# Patient Record
Sex: FEMALE | Race: WHITE | Employment: FULL TIME | URBAN - METROPOLITAN AREA
[De-identification: names, ages, dates, MRNs, and addresses within clinical notes are randomized per-mention and may not be internally consistent; named-entity substitution may affect disease eponyms.]

---

## 2018-12-26 ENCOUNTER — OFFICE VISIT (OUTPATIENT)
Dept: SURGERY | Age: 28
End: 2018-12-26

## 2018-12-26 VITALS
DIASTOLIC BLOOD PRESSURE: 76 MMHG | BODY MASS INDEX: 20.49 KG/M2 | SYSTOLIC BLOOD PRESSURE: 112 MMHG | HEART RATE: 68 BPM | WEIGHT: 120 LBS | HEIGHT: 64 IN

## 2018-12-26 DIAGNOSIS — R23.8 FACIAL AGING: Primary | ICD-10-CM

## 2018-12-26 PROCEDURE — DM00130 PR DERM ONLY BOTOX INJ LEVEL 4: Performed by: PLASTIC SURGERY

## 2018-12-27 NOTE — PROGRESS NOTES
Botulinum Toxin Injection Procedure Note:      The risks, benefits and options were discussed with the pt. The risks included but not limited to pain, bleeding, infection, asymmetry,  and need for further procedures. The patient understands that there is a risk for upper eyelid ptosis. There may be a need for touch up injections and follow up at 2 weeks is encouraged. All of Her questions were answered to Her satisfaction and She agrees to proceed with the operation. The glabella and forehead was cleansed with alcohol. Ice was used to numb the area. The different FDA approved brands of botulinum toxin A were discussed with the patient, Botox was agreed upon and reconstituted in a concentration of 1 unit/ 0.01cc with sterile injectable saline. 25 units were injected into the areas. There was pinpoint bleeding and local redness. The patient tolerated the procedure well. The patient was counseled to use ice for the next hour and limit strenuous activity for 24 hours. Follow up in 2 weeks for check or 3 months for re-injection. I attest that the patient was seen and examined by me, and concur with the documentation above. I agree with the assessment and the plan outlined. This document is generated, in part, by voice recognition software and thus  syntax and grammatical errors are possible.     Aaron Carney  9:44 AM  12/27/2018

## 2019-05-01 ENCOUNTER — OFFICE VISIT (OUTPATIENT)
Dept: SURGERY | Age: 29
End: 2019-05-01
Payer: COMMERCIAL

## 2019-05-01 ENCOUNTER — HOSPITAL ENCOUNTER (OUTPATIENT)
Age: 29
Discharge: HOME | End: 2019-05-03
Payer: COMMERCIAL

## 2019-05-01 VITALS — HEIGHT: 64 IN | BODY MASS INDEX: 19.97 KG/M2 | WEIGHT: 117 LBS

## 2019-05-01 DIAGNOSIS — L98.9 SKIN LESION: Primary | ICD-10-CM

## 2019-05-01 PROCEDURE — 99204 OFFICE O/P NEW MOD 45 MIN: CPT | Performed by: PLASTIC SURGERY

## 2019-05-01 PROCEDURE — 88305 TISSUE EXAM BY PATHOLOGIST: CPT

## 2019-05-01 RX ORDER — LIDOCAINE HYDROCHLORIDE AND EPINEPHRINE 10; 10 MG/ML; UG/ML
20 INJECTION, SOLUTION INFILTRATION; PERINEURAL ONCE
Status: COMPLETED | OUTPATIENT
Start: 2019-05-01 | End: 2019-05-01

## 2019-05-01 RX ADMIN — LIDOCAINE HYDROCHLORIDE AND EPINEPHRINE 20 ML: 10; 10 INJECTION, SOLUTION INFILTRATION; PERINEURAL at 15:20

## 2019-05-01 NOTE — PROGRESS NOTES
Department of Plastic Surgery - Adult  Attending Consult Note      CHIEF COMPLAINT:  Lesion of stomach     History Obtained From:  patient    HISTORY OF PRESENT ILLNESS:                The patient is a 26 y.o. female who presents with lesion to the abdomen. The patient states that they first noticed the lesion a few years ago. It has grown in size since they first noticed the lesion. The lesion has changed in color and has had discharge or bleeding. The pt has not the lesion biopsied previously. The patient has not had the lesion removed previously. The patient states the lesion is not painful. The pt denies any associated symptoms. Past Medical History:    No past medical history on file. Past Surgical History:    Past Surgical History:   Procedure Laterality Date    BREAST SURGERY Bilateral 12/22/2016    exchange bilateral breast silicone implant    FOOT SURGERY      planters warts    OTHER SURGICAL HISTORY Bilateral 12/14/15    breast augmentationwith placement silicone implants    TONSILLECTOMY       Current Medications:   No current facility-administered medications for this visit. Allergies:  Patient has no known allergies. Social History:   Social History     Socioeconomic History    Marital status: Single     Spouse name: Not on file    Number of children: Not on file    Years of education: Not on file    Highest education level: Not on file   Occupational History    Not on file   Social Needs    Financial resource strain: Not on file    Food insecurity:     Worry: Not on file     Inability: Not on file    Transportation needs:     Medical: Not on file     Non-medical: Not on file   Tobacco Use    Smoking status: Never Smoker    Smokeless tobacco: Never Used   Substance and Sexual Activity    Alcohol use:  Yes     Alcohol/week: 0.0 oz     Comment: social    Drug use: No    Sexual activity: Not on file   Lifestyle    Physical activity:     Days per week: Not on file Minutes per session: Not on file    Stress: Not on file   Relationships    Social connections:     Talks on phone: Not on file     Gets together: Not on file     Attends Mandaen service: Not on file     Active member of club or organization: Not on file     Attends meetings of clubs or organizations: Not on file     Relationship status: Not on file    Intimate partner violence:     Fear of current or ex partner: Not on file     Emotionally abused: Not on file     Physically abused: Not on file     Forced sexual activity: Not on file   Other Topics Concern    Not on file   Social History Narrative    Not on file     Family History:   Family History   Problem Relation Age of Onset    Cancer Maternal Grandmother         breast       REVIEW OF SYSTEMS:    CONSTITUTIONAL:  negative for  fevers, chills, sweats and fatigue  EYES: negative for dipolpia or acute vision loss. RESPIRATORY:  negative for  dry cough, cough with sputum, dyspnea, wheezing and chest pain  HENT:negative for pain, headache, difficulty swallowing or nose bleeds. CARDIOVASCULAR:  negative for  chest pain, dyspnea, palpitations, syncope  GASTROINTESTINAL:  negative for nausea, vomiting, change in bowel habits, diarrhea, constipation and abdominal pain  EXTREMITIES: negative for edema  MUSCULOSKELETAL: negative for muscle weakness  SKIN: positive for lesion, negative for itching or rashes. HEME: negative for easy brusing or bleeding  BEHAVIOR/PSYCH:  negative for poor appetite, increased appetite, decreased sleep and poor concentration      PHYSICAL EXAM:    VITALS:  Ht 5' 4\" (1.626 m)   Wt 117 lb (53.1 kg)   LMP 04/26/2019   Breastfeeding?  No   BMI 20.08 kg/m²   CONSTITUTIONAL:  awake, alert, cooperative, no apparent distress, and appears stated age  EYES: PERRLA, EOMI, no signs of occular infection  LUNGS:  No increased work of breathing, good air exchange  CARDIOVASCULAR:  Normal rate  ABDOMEN:  Soft, non-distended  EXTREMITIES: no signs of clubbing or cyanosis. MUSCULOSKELETAL: negative for flaccid muscle tone or spastic movements. NEURO: Cranial nerves II-XII grossly intact. No signs of agitated mood. SKIN: abdomen lesion-  3mm x 3mm,  brown in color, irregular border, raised, no signs of bleeding,drainage or infection. Non tender to palpation. DATA:    Labs: CBC: No results found for: WBC, RBC, HGB, HCT, MCV, MCH, MCHC, RDW, PLT, MPV  BMP:  No results found for: NA, K, CL, CO2, BUN, LABALBU, CREATININE, CALCIUM, GFRAA, LABGLOM, GLUCOSE    Radiology Review:  No radiology needed at this time  Pathology Review:  No Pathology reviewed       IMPRESSION/RECOMMENDATIONS:        Diagnosis  -) Abdomen skin lesion    -We will plan to proceed and have the lesion biopsied.  -The risks, benefits and options were discussed with the pt. The risks included but not limited to pain, bleeding, infection, heavy scarring, damage to surrounding structures, fluid collections, asymmetry, and need for further procedures. All of Her questions were answered to their satisfaction and She agrees to proceed with the procedure.  -After consent was obtained, the area was cleansed with an alcohol swab. Local anesthesia consisting of 1% lidocaine with 1:100,000 epinepherine was injected  surrounding the area. The local was allowed to work. Using a 10-blade the lesion was shaved, hemostasis was obtained and a bandage was placed. The procedure was performed by Dr. Good Mirza    The patient was educated to keep the bandage in place and apply bacitracin to the wound site BID. OK to shower at this time. Advised the patient that they can allow soap and water to rinse of the wound site while showering. Once they are done in the shower they are to pat dry the incision site with a clean paper towel. No baths, hot tubs or soaking of the wound site at this time. Pt voices understanding.      Photos obtained    We will call her with results       I attest that the patient was seen and examined by me, and concur with the documentation above. I agree with the assessment and the plan outlined. This document is generated, in part, by voice recognition software and thus  syntax and grammatical errors are possible.     Yelena Tavarez  2:33 PM  5/8/2019

## 2019-05-09 ENCOUNTER — TELEPHONE (OUTPATIENT)
Dept: SURGERY | Age: 29
End: 2019-05-09

## 2020-11-18 ENCOUNTER — HOSPITAL ENCOUNTER (OUTPATIENT)
Age: 30
Discharge: HOME | End: 2020-11-18
Payer: COMMERCIAL

## 2020-11-18 LAB
GLUCOSE FASTING: 83 MG/DL
GLUCOSE TOLERANCE TEST 2 HOUR: 109 MG/DL

## 2020-11-18 PROCEDURE — 82950 GLUCOSE TEST: CPT

## 2020-11-18 PROCEDURE — 82947 ASSAY GLUCOSE BLOOD QUANT: CPT

## 2020-11-18 PROCEDURE — 83525 ASSAY OF INSULIN: CPT

## 2020-11-18 PROCEDURE — 36415 COLL VENOUS BLD VENIPUNCTURE: CPT

## 2020-11-21 LAB — INSULIN: 3 UIU/ML (ref 3–19)

## 2021-03-18 ENCOUNTER — IMMUNIZATION (OUTPATIENT)
Dept: PRIMARY CARE CLINIC | Age: 31
End: 2021-03-18
Payer: COMMERCIAL

## 2021-03-18 PROCEDURE — 91300 COVID-19, PFIZER VACCINE 30MCG/0.3ML DOSE: CPT | Performed by: NURSE PRACTITIONER

## 2021-03-18 PROCEDURE — 0001A COVID-19, PFIZER VACCINE 30MCG/0.3ML DOSE: CPT | Performed by: NURSE PRACTITIONER

## 2021-04-08 ENCOUNTER — IMMUNIZATION (OUTPATIENT)
Dept: PRIMARY CARE CLINIC | Age: 31
End: 2021-04-08
Payer: COMMERCIAL

## 2021-04-08 PROCEDURE — 91300 COVID-19, PFIZER VACCINE 30MCG/0.3ML DOSE: CPT | Performed by: NURSE PRACTITIONER

## 2021-04-08 PROCEDURE — 0002A COVID-19, PFIZER VACCINE 30MCG/0.3ML DOSE: CPT | Performed by: NURSE PRACTITIONER

## 2021-06-28 DIAGNOSIS — Z34.92 PRENATAL CARE IN SECOND TRIMESTER: Primary | ICD-10-CM

## 2021-07-14 ENCOUNTER — ANCILLARY PROCEDURE (OUTPATIENT)
Dept: OBGYN CLINIC | Age: 31
End: 2021-07-14
Payer: COMMERCIAL

## 2021-07-14 ENCOUNTER — ROUTINE PRENATAL (OUTPATIENT)
Dept: OBGYN CLINIC | Age: 31
End: 2021-07-14
Payer: COMMERCIAL

## 2021-07-14 VITALS
DIASTOLIC BLOOD PRESSURE: 82 MMHG | HEIGHT: 64 IN | WEIGHT: 141.8 LBS | BODY MASS INDEX: 24.21 KG/M2 | SYSTOLIC BLOOD PRESSURE: 121 MMHG | HEART RATE: 102 BPM

## 2021-07-14 DIAGNOSIS — Z3A.16 16 WEEKS GESTATION OF PREGNANCY: ICD-10-CM

## 2021-07-14 DIAGNOSIS — Z36.89 ENCOUNTER FOR FETAL ANATOMIC SURVEY: ICD-10-CM

## 2021-07-14 DIAGNOSIS — O09.812 ENCOUNTER FOR SUPERVISION OF PREGNANCY RESULTING FROM ASSISTED REPRODUCTIVE TECHNOLOGY IN SECOND TRIMESTER, ANTEPARTUM: Primary | ICD-10-CM

## 2021-07-14 DIAGNOSIS — O44.02 PLACENTA PREVIA, SECOND TRIMESTER: ICD-10-CM

## 2021-07-14 LAB
GLUCOSE URINE, POC: NORMAL
PROTEIN UA: NEGATIVE

## 2021-07-14 PROCEDURE — 99203 OFFICE O/P NEW LOW 30 MIN: CPT | Performed by: OBSTETRICS & GYNECOLOGY

## 2021-07-14 PROCEDURE — 99999 PR OFFICE/OUTPT VISIT,PROCEDURE ONLY: CPT | Performed by: OBSTETRICS & GYNECOLOGY

## 2021-07-14 PROCEDURE — 81002 URINALYSIS NONAUTO W/O SCOPE: CPT | Performed by: OBSTETRICS & GYNECOLOGY

## 2021-07-14 PROCEDURE — 76805 OB US >/= 14 WKS SNGL FETUS: CPT | Performed by: OBSTETRICS & GYNECOLOGY

## 2021-07-14 NOTE — PROGRESS NOTES
21    RE:  Nella Mcmahon   : 1990   AGE: 27 y.o. REFERRING PHYSICIAN:           Edger Paget, M.D. Dear Dr. Naranjo Jump you for referring Nella aponte 27 y.o.   to our office. REASON FOR REFERRAL, PER YOUR REQUEST:  Fetal ultrasound assessment. A detailed report is enclosed for your review. Of note is that the estimated gestational age is 16w0d based on the composite gestational age established by fetal biometric assessment today. These dates are consistent with the patient's established dating parameters. No apparent gross fetal anatomic abnormalities were noted in the areas visualized. The amniotic fluid volume is within normal limits. No further follow-up assessments have been scheduled in our office, however; we would be happy to see your patient at any time if you request.    Thank you again, doctor, for allowing us to be of service to your patient. If I can be of further assistance, please do not hesitate to call.       Sincerely,        Ollie Cockayne, M.D., [address] [facility]      Current encounter billing:  [place] OFFICE/OUTPT VISIT,PROCEDURE ONLY [[id number]  US OB 14 Plus Weeks Single or First Gestation [[id number] Custom]

## 2021-07-14 NOTE — PATIENT INSTRUCTIONS
Please arrive for your scheduled appointment at least 15 minutes early with your actual insurance card+ a photo ID. Also if you need any refills ordered or have questions, it may take up 48 hours to reply. Please allow ample time for your refills. Call me when you use last refill. Thank you for your cooperation. Any questions contact [facility] at [phone]. If you are experiencing an emergency and need immediate help, call 911 or go to go emergency room or labor and delivery. if you are sick, not feeling well or have an infectious process going on please reschedule your appointment by calling [phone]. Also if any family members are not feeling well, please do not bring them to your appointment. We appreciate your cooperation. We are doing this in order to protect our pregnant mothers+ their babies. Call your primary obstetrician with bleeding, leaking of fluid, abdominal tenderness, headache, blurry vision, epigastric pain and increased urinary frequency. Patient Education        Weeks 14 to 25 of Your Pregnancy: Care Instructions  Your Care Instructions     During this time, you may start to \"show,\" so that you look pregnant to people around you. You may also notice some changes in your skin, such as itchy spots on your palms or acne on your face. Your baby is now able to pass urine, and your baby's first stool (meconium) is starting to collect in his or her intestines. Hair is also beginning to grow on your baby's head. At your next visit, between weeks 18 and 20, your doctor may do an ultrasound test. The test allows your doctor to check for certain problems. Your doctor can also tell the sex of your baby. This is a good time to think about whether you want to know whether your baby is a boy or a girl. Talk to your doctor about getting a flu shot to help keep you healthy during your pregnancy. As your pregnancy moves along, it is common to worry or feel anxious. Your body is changing a lot.  And you are thinking about giving birth, the health of your baby, and becoming a parent. You can learn to cope with any anxiety and stress you feel. Follow-up care is a key part of your treatment and safety. Be sure to make and go to all appointments, and call your doctor if you are having problems. It's also a good idea to know your test results and keep a list of the medicines you take. How can you care for yourself at home? Reduce stress    · Ask for help with cooking and housekeeping.     · Figure out who or what causes your stress. Avoid these people or situations as much as possible.     · Relax every day. Taking 10- to 15-minute breaks can make a big difference. Take a walk, listen to music, or take a warm bath.     · Learn relaxation techniques at prenatal or yoga class. Or buy a relaxation tape.     · List your fears about having a baby and becoming a parent. Share the list with someone you trust. Decide which worries are really small, and try to let them go. Exercise    · If you did not exercise much before pregnancy, start slowly. Walking is best. [employer], and do a little more every day.     · Brisk walking, easy jogging, low-impact aerobics, water aerobics, and yoga are good choices. Some sports, such as scuba diving, horseback riding, downhill skiing, gymnastics, and water skiing, are not a good idea.     · Try to do at least 2½ hours a week of moderate exercise, such as a fast walk. One way to do this is to be active 30 minutes a day, at least 5 days a week.     · Wear loose clothing. And wear shoes and a bra that provide good support.     · Warm up and cool down to start and finish your exercise.     · If you want to use weights, be sure to use light weights. They reduce stress on your joints.    Stay at the best weight for you    · Experts recommend that you gain about 1 pound a month during the first 3 months of your pregnancy.     · Experts recommend that you gain about 1 pound a week during your last 6 months of pregnancy, for a total weight gain of 25 to 35 pounds.     · If you are underweight, you will need to gain more weight (about 28 to 40 pounds).     · If you are overweight, you may not need to gain as much weight (about 15 to 25 pounds).     · If you are gaining weight too fast, use common sense. Exercise every day, and limit sweets, fast foods, and fats. Choose lean meats, fruits, and vegetables.     · If you are having twins or more, your doctor may refer you to a dietitian. Where can you learn more? Go to https://chpecatalinaeb.Worldrat. org and sign in to your Partender account. Enter J096 in the KnewCoin box to learn more about \"Weeks 14 to 18 of Your Pregnancy: Care Instructions. \"     If you do not have an account, please click on the \"Sign Up Now\" link. Current as of: October 8, 2020               Content Version: 12.9  © 2006-2021 [employer]. Care instructions adapted under license by [facility] . If you have questions about a medical condition or this instruction, always ask your healthcare professional. [place] [address] any warranty or liability for your use of this information. Patient Education        Learning About When to Call Your Doctor During Pregnancy (Up to 20 Weeks)  Your Care Instructions     It's common to have concerns about what might be a problem during pregnancy. Although most pregnant women don't have any serious problems, it's important to know when to call your doctor if you have certain symptoms. These are general suggestions. Your doctor may give you some more information about when to call. When to call your doctor (up to 20 weeks)  Call 911 anytime you think you may need emergency care. For example, call if:  · You passed out (lost consciousness). Call your doctor now or seek immediate medical care if:  · You have a fever. · You have vaginal bleeding.   · You are dizzy or lightheaded, or you feel like you may faint. · You have symptoms of a urinary tract infection. These may include:  ? Pain or burning when you urinate. ? A frequent need to urinate without being able to pass much urine. ? Pain in the flank, which is just below the rib cage and above the waist on either side of the back. ? Blood in your urine. · You have belly pain. · You think you are having contractions. · You have a sudden release of fluid from your vagina. Watch closely for changes in your health, and be sure to contact your doctor if:  · You have vaginal discharge that smells bad. · You have other concerns about your pregnancy. Follow-up care is a key part of your treatment and safety. Be sure to make and go to all appointments, and call your doctor if you are having problems. It's also a good idea to know your test results and keep a list of the medicines you take. Where can you learn more? Go to https://LeadGeniuspeWistia.BlueRonin. org and sign in to your [employer] account. Enter W020 in the Thumbs Up box to learn more about \"Learning About When to Call Your Doctor During Pregnancy (Up to 20 Weeks). \"     If you do not have an account, please click on the \"Sign Up Now\" link. Current as of: October 8, 2020               Content Version: 12.9  © 2006-2021 [employer]wise[employer]. Care instructions adapted under license by [facility] If you have questions about a medical condition or this instruction, always ask your healthcare professional. [place] [address] any warranty or liability for your use of this information.

## 2021-07-14 NOTE — LETTER
21    RE:  Van Valdez   : 1990   AGE: 27 y.o. REFERRING PHYSICIAN:           Yazmin Logan M.D. Dear Dr. Netta Cohen you for referring Van aponte 27 y.o.   to our office. REASON FOR REFERRAL, PER YOUR REQUEST:  Fetal ultrasound assessment. A detailed report is enclosed for your review. Of note is that the estimated gestational age is 16w0d based on the composite gestational age established by fetal biometric assessment today. These dates are consistent with the patient's established dating parameters. No apparent gross fetal anatomic abnormalities were noted in the areas visualized. The amniotic fluid volume is within normal limits. No further follow-up assessments have been scheduled in our office, however; we would be happy to see your patient at any time if you request.    Thank you again, doctor, for allowing us to be of service to your patient. If I can be of further assistance, please do not hesitate to call.       Sincerely,        Khadar Yoo M.D., [address] [facility]      Current encounter billing:  [place] OFFICE/OUTPT VISIT,PROCEDURE ONLY [[id number]  US OB 14 Plus Weeks Single or First Gestation [[id number] Custom]

## 2021-10-13 ENCOUNTER — ANCILLARY PROCEDURE (OUTPATIENT)
Dept: OBGYN CLINIC | Age: 31
End: 2021-10-13
Payer: COMMERCIAL

## 2021-10-13 ENCOUNTER — ROUTINE PRENATAL (OUTPATIENT)
Dept: OBGYN CLINIC | Age: 31
End: 2021-10-13
Payer: COMMERCIAL

## 2021-10-13 VITALS
WEIGHT: 156.5 LBS | DIASTOLIC BLOOD PRESSURE: 79 MMHG | BODY MASS INDEX: 26.86 KG/M2 | HEART RATE: 86 BPM | SYSTOLIC BLOOD PRESSURE: 120 MMHG

## 2021-10-13 DIAGNOSIS — O09.812 ENCOUNTER FOR SUPERVISION OF PREGNANCY RESULTING FROM ASSISTED REPRODUCTIVE TECHNOLOGY IN SECOND TRIMESTER, ANTEPARTUM: ICD-10-CM

## 2021-10-13 DIAGNOSIS — O44.02 PLACENTA PREVIA, SECOND TRIMESTER: Primary | ICD-10-CM

## 2021-10-13 DIAGNOSIS — Z3A.29 29 WEEKS GESTATION OF PREGNANCY: ICD-10-CM

## 2021-10-13 PROCEDURE — 99212 OFFICE O/P EST SF 10 MIN: CPT | Performed by: OBSTETRICS & GYNECOLOGY

## 2021-10-13 PROCEDURE — 76817 TRANSVAGINAL US OBSTETRIC: CPT | Performed by: OBSTETRICS & GYNECOLOGY

## 2021-10-13 PROCEDURE — 99203 OFFICE O/P NEW LOW 30 MIN: CPT | Performed by: OBSTETRICS & GYNECOLOGY

## 2021-10-13 PROCEDURE — 81002 URINALYSIS NONAUTO W/O SCOPE: CPT | Performed by: OBSTETRICS & GYNECOLOGY

## 2021-10-13 PROCEDURE — 76816 OB US FOLLOW-UP PER FETUS: CPT | Performed by: OBSTETRICS & GYNECOLOGY

## 2021-10-13 PROCEDURE — 76819 FETAL BIOPHYS PROFIL W/O NST: CPT | Performed by: OBSTETRICS & GYNECOLOGY

## 2021-10-13 NOTE — PROGRESS NOTES
10/13/21    RE:  Roro Damian   : 1990   AGE: 27 y.o. REFERRING PHYSICIAN:                    Vallarie Babinski, MD    Dear   This is a follow-up on Mrs. Roro Damian a 27 y.o.   who is seen today in our office. REASON FOR CONSULTATION:  · Follow-up on a pregnant patient with history of vaginal bleeding from placenta previa, and recent transfer of prenatal care to our office from [place]. Mrs Roro Damian gave the following history when I saw her today:    OB History    Para Term  AB Living   1 0 0 0 0 0   SAB TAB Ectopic Molar Multiple Live Births   0 0 0 0 0 0      # Outcome Date GA Lbr Leon/2nd Weight Sex Delivery Anes PTL Lv   1 Current              PAST GYNECOLOGICAL  HISTORY:  Negative for abnormal pap smears requiring surgical treatment. Negative for sexually transmitted diseases. PAST MEDICAL HISTORY:   Negative for Hypertension, Diabetes, Thyroid disease , Asthma or Heart disease. PAST SURGICAL HISTORY:  Past Surgical History:   Procedure Laterality Date    BREAST SURGERY Bilateral 2016    exchange bilateral breast silicone implant    CERVICAL POLYP REMOVAL      FOOT SURGERY      planters warts    OTHER SURGICAL HISTORY Bilateral 12/14/15    breast augmentationwith placement silicone implants    TONSILLECTOMY     Negative for Appendectomy, Cholecystectomy or surgery on the cervix such as LEEP, [facility] or Cryotherapy. ALLERGIES:    No Known Allergies    MEDICATIONS:    Prenatal Vitamins    SOCIAL  HISTORY: Denies smoking, Alcohol and Drug abuse.     REVIEW OF SYSTEMS:    CONSTITUTIONAL : No fever, no chills   HEENT :  No headache, no visual changes, no rhinorrhea, no sore throat   CARDIOVASCULAR :  No pain, no palpitations, no edema   RESPIRATORY :  No pain, no shortness of breath   GASTROINTESTINAL : No N/V, no D/C, no abdominal pain   GENITOURINARY :  No dysuria, hematuria and no incontinence   MUSCULOSKELETAL:  No myalgia, No back pain  NEUROLOGICAL :  No numbness, no tingling, no tremors. No history of seizures    FAMILY MEDICAL HISTORY:   Negative for birth defects, chromosomal anomalies and Mental retardation. OB Genetic Screening    Patient's Age 35+ at Date of Delivery No     Thalassemia MCV<80 No     Neural Tube Defect No     Congenital Heart Defect No     Down Syndrome No     Barron-Sachs No     Sickle Cell Disease or Trait No     Hemophilia No     Muscular Dystrophy No     Cystic Fibrosis No     Holt Chorea No     Mental Retardation/Autism No     Was Person Treated for Fragilex? No     Other Inherited Genetic Chromosomal Disorder? No     Maternal Metabolic Disorder No     Patient or [de-identified] Father Had Other Defects? No     Recurrent Pregnancy Loss or Still Birth? No        OB Infection History    High Risk Hepatitis B/Immunized? Yes     Live with Someone with or Exposed to TB? No     Patient or Partner has Hx of Genital Herpes? No     Rash or Viral Illness Since LMP? No     History of STD/GC/Chlamydia/HPV/Syphilis? No        Since her last visit to our office,. Mrs Greg Vital:  · Had vaginal bleeding September 23, 2021 in [place]. She was admitted for 2 nights. Was told that the bleeding was from the placenta previa. · Was found to have an elevated 1 hour 50 g Glucola test of 150 mg/dL in [place]. · Transferred her prenatal care from [place] to your office recently. When seen today in our office she had no complaints. She said that she has no more episodes of vaginal bleeding. PHYSICAL EXAMINATION:    General Appearance:  Healthy looking, alert, no acute distress. Eyes:     No pallor, no icterus, no photophobia. Ears:     No ear drainage. Nose:     No nasal drainage, no paranasal sinus tenderness. Throat:   Mucosa moist, no oral thrush, no exudate. Neck:     No nuchal rigidity. Back:     No CVA tenderness. Abdomen:    Soft nontender.   Extremities:    No pretibial pitting edema, no calf muscle tenderness. Skin:     No rashes, no lesions. BP: 120/79 Weight: 156 lb 8 oz (71 kg)   Pulse: 86     Body mass index is 26.86 kg/m². Urine dipstick:  Glucose : Negative   Albumin:  Negative       An ultrasound evaluation was done in our office today. Please refer to the enclosed copy of the ultrasound report for further information. IMPRESSION:  1. A 29w0d intrauterine pregnancy. 2. Placenta previa resolved. 3. Elevated 1 hour 50 g Glucola test of 150 mg/dL (in [place]), scheduled to have GTT next week. RECOMMENDATIONS/PLAN:  I discussed with the patient the following points:    1. The benefits and limitations of ultrasound in prenatal diagnosis. Some defects might not always be seen by ultrasound. Estimated incidence of these defects in the general population is 2- 4%. 2. No structural  anomalies are noted. Only genetic amniocentesis can rule out fetal chromosome anomalies. Normal ultrasound does not. 3. The size of her baby is appropriate for gestational age. 4. Fetal well-being was confirmed today. The amount of fluid around baby is normal.  The Biophysical profile score of 8/8 is reassuring, and the umbilical artery Doppler studies are normal.  5. She should monitor fetal well-being at home by counting movements after dinner. Her baby should  move 10 times in 2 hours; otherwise, she should call your office immediately. She is also to call, if she develops any headaches, blurred vision, abdominal pain, bleeding, or spotting, which are signs of preeclampsia. 6. No evidence of placenta previa on transvaginal ultrasound evaluation. Cervical length is reassuring against risk of  delivery. 7. She is to continue to follow with you in your office for ongoing obstetric care. 8. I recommend follow-up ultrasound evaluation in our [facility] office in 4 weeks to check on fetal wellbeing anatomy and growth.     Thank you again, doctor, for allowing us to be of service to your patient. If I can be of further assistance, please do not hesitate to call. Sincerely,        Mir Shi M.D., [address] [facility]      The total time in minutes spent reviewing medical records, reviewing imaging studies, performing ultrasonic imaging, reviewing laboratory testing, and documenting information was over 20  minutes, of which, 50% of the time was spent in patient education, counseling, and coordinating care with the patient, her provider, and/or her family. I answered all of her questions to her satisfaction. Current encounter billing:  [place] OFFICE OUTPATIENT VISIT 10-19 MINUTES [[id number]]  US OB Follow Up Transabdominal Approach [[id number] Custom]  US Fetal Biophysical Profile WO Non Stress Testing [47268 Custom]  US OB Transvaginal [[id number] Custom]    **This report has been created using voice recognition software.  It may contain minor errors     which are inherent in voice recognition technology**

## 2021-10-13 NOTE — LETTER
10/13/21    RE:  Luis Isaacs   : 1990   AGE: 27 y.o. REFERRING PHYSICIAN:                    Darryle Goo, MD    Dear   This is a follow-up on Mrs. Luis Isaacs a 27 y.o.   who is seen today in our office. REASON FOR CONSULTATION:  · Follow-up on a pregnant patient with history of vaginal bleeding from placenta previa, and recent transfer of prenatal care to our office from [place]. Mrs Luis Isaacs gave the following history when I saw her today:    OB History    Para Term  AB Living   1 0 0 0 0 0   SAB TAB Ectopic Molar Multiple Live Births   0 0 0 0 0 0      # Outcome Date GA Lbr Leon/2nd Weight Sex Delivery Anes PTL Lv   1 Current              PAST GYNECOLOGICAL  HISTORY:  Negative for abnormal pap smears requiring surgical treatment. Negative for sexually transmitted diseases. PAST MEDICAL HISTORY:   Negative for Hypertension, Diabetes, Thyroid disease , Asthma or Heart disease. PAST SURGICAL HISTORY:  Past Surgical History:   Procedure Laterality Date    BREAST SURGERY Bilateral 2016    exchange bilateral breast silicone implant    CERVICAL POLYP REMOVAL      FOOT SURGERY      planters warts    OTHER SURGICAL HISTORY Bilateral 12/14/15    breast augmentationwith placement silicone implants    TONSILLECTOMY     Negative for Appendectomy, Cholecystectomy or surgery on the cervix such as LEEP, [facility] or Cryotherapy. ALLERGIES:    No Known Allergies    MEDICATIONS:    Prenatal Vitamins    SOCIAL  HISTORY: Denies smoking, Alcohol and Drug abuse.     REVIEW OF SYSTEMS:    CONSTITUTIONAL : No fever, no chills   HEENT :  No headache, no visual changes, no rhinorrhea, no sore throat   CARDIOVASCULAR :  No pain, no palpitations, no edema   RESPIRATORY :  No pain, no shortness of breath   GASTROINTESTINAL : No N/V, no D/C, no abdominal pain   GENITOURINARY :  No dysuria, hematuria and no incontinence   MUSCULOSKELETAL:  No myalgia, No back pain  NEUROLOGICAL :  No numbness, no tingling, no tremors. No history of seizures    FAMILY MEDICAL HISTORY:   Negative for birth defects, chromosomal anomalies and Mental retardation. OB Genetic Screening    Patient's Age 35+ at Date of Delivery No     Thalassemia MCV<80 No     Neural Tube Defect No     Congenital Heart Defect No     Down Syndrome No     Barron-Sachs No     Sickle Cell Disease or Trait No     Hemophilia No     Muscular Dystrophy No     Cystic Fibrosis No     [place] Chorea No     Mental Retardation/Autism No     Was Person Treated for Fragilex? No     Other Inherited Genetic Chromosomal Disorder? No     Maternal Metabolic Disorder No     Patient or [de-identified] Father Had Other Defects? No     Recurrent Pregnancy Loss or Still Birth? No        OB Infection History    High Risk Hepatitis B/Immunized? Yes     Live with Someone with or Exposed to TB? No     Patient or Partner has Hx of Genital Herpes? No     Rash or Viral Illness Since LMP? No     History of STD/GC/Chlamydia/HPV/Syphilis? No        Since her last visit to our office,. Mrs Ace De La Cruz:  · Had vaginal bleeding September 23, 2021 in [place]. She was admitted for 2 nights. Was told that the bleeding was from the placenta previa. · Was found to have an elevated 1 hour 50 g Glucola test of 150 mg/dL in [place]. · Transferred her prenatal care from [place] to your office recently. When seen today in our office she had no complaints. She said that she has no more episodes of vaginal bleeding. PHYSICAL EXAMINATION:    General Appearance:  Healthy looking, alert, no acute distress. Eyes:     No pallor, no icterus, no photophobia. Ears:     No ear drainage. Nose:     No nasal drainage, no paranasal sinus tenderness. Throat:   Mucosa moist, no oral thrush, no exudate. Neck:     No nuchal rigidity. Back:     No CVA tenderness. Abdomen:    Soft nontender.   Extremities:    No pretibial pitting edema, no calf muscle tenderness. Skin:     No rashes, no lesions. BP: 120/79 Weight: 156 lb 8 oz (71 kg)   Pulse: 86     Body mass index is 26.86 kg/m². Urine dipstick:  Glucose : Negative   Albumin:  Negative       An ultrasound evaluation was done in our office today. Please refer to the enclosed copy of the ultrasound report for further information. IMPRESSION:  1. A 29w0d intrauterine pregnancy. 2. Placenta previa resolved. 3. Elevated 1 hour 50 g Glucola test of 150 mg/dL (in [place]), scheduled to have GTT next week. RECOMMENDATIONS/PLAN:  I discussed with the patient the following points:    1. The benefits and limitations of ultrasound in prenatal diagnosis. Some defects might not always be seen by ultrasound. Estimated incidence of these defects in the general population is 2- 4%. 2. No structural  anomalies are noted. Only genetic amniocentesis can rule out fetal chromosome anomalies. Normal ultrasound does not. 3. The size of her baby is appropriate for gestational age. 4. Fetal well-being was confirmed today. The amount of fluid around baby is normal.  The Biophysical profile score of 8/8 is reassuring, and the umbilical artery Doppler studies are normal.  5. She should monitor fetal well-being at home by counting movements after dinner. Her baby should  move 10 times in 2 hours; otherwise, she should call your office immediately. She is also to call, if she develops any headaches, blurred vision, abdominal pain, bleeding, or spotting, which are signs of preeclampsia. 6. No evidence of placenta previa on transvaginal ultrasound evaluation. Cervical length is reassuring against risk of  delivery. 7. She is to continue to follow with you in your office for ongoing obstetric care. 8. I recommend follow-up ultrasound evaluation in our [facility] office in 4 weeks to check on fetal wellbeing anatomy and growth.     Thank you again, doctor, for allowing us to be of service to your patient. If I can be of further assistance, please do not hesitate to call. Sincerely,        Karin Valente M.D., [address] [facility]      The total time in minutes spent reviewing medical records, reviewing imaging studies, performing ultrasonic imaging, reviewing laboratory testing, and documenting information was over 20  minutes, of which, 50% of the time was spent in patient education, counseling, and coordinating care with the patient, her provider, and/or her family. I answered all of her questions to her satisfaction. Current encounter billing:  [facility] OFFICE OUTPATIENT VISIT 10-19 MINUTES [[id number]]  US OB Follow Up Transabdominal Approach [EXE189 Custom]  US Fetal Biophysical Profile WO Non Stress Testing [49627 Custom]  US OB Transvaginal [53153 Custom]    **This report has been created using voice recognition software.  It may contain minor errors     which are inherent in voice recognition technology**

## 2021-10-14 LAB
GLUCOSE URINE, POC: NORMAL
PROTEIN UA: NEGATIVE

## 2021-10-25 ENCOUNTER — HOSPITAL ENCOUNTER (OUTPATIENT)
Dept: DIABETES SERVICES | Age: 31
Setting detail: THERAPIES SERIES
Discharge: HOME | End: 2021-10-25
Payer: COMMERCIAL

## 2021-10-25 VITALS — HEIGHT: 64 IN | WEIGHT: 155 LBS | BODY MASS INDEX: 26.46 KG/M2

## 2021-10-25 PROCEDURE — G0108 DIAB MANAGE TRN  PER INDIV: HCPCS

## 2021-10-25 NOTE — PROGRESS NOTES
Diabetes Self-Management Education Record    Participant Name: Dima Mata  Referring Provider: Ranjana Rodriguez MD  Assessment/Evaluation Ratings:  1=Needs Instruction   4=Demonstrates Understanding/Competency  2=Needs Review   NC=Not Covered    3=Comprehends Key Points  N/A=Not Applicable  Topics/Learning Objectives Pre-session Assess Date:  Instructor initials/date  10/25/21 CS Instr. Date    Instructor initials/date  10/25/21 CS Follow-up Post- session Eval Comments   Diabetes disease process & Treatment process:   -Define type of diabetes in simple terms.  - Describe the ABCs of  diabetes management  -Identify own type of diabetes  -Identify lifestyle changes/treatment options  -other:  1 [x] All     []  []  []  []  []  4 10/25/21 CS  Newly dx GDM, first pregnancy, 30 weeks, due 12/29/21   Developing strategies for Healthy coping/psychosocial issues:    -Describe feelings about living with diabetes  -Identify coping strategies and sources of stress  -Identify support needed & support network available  -Complete PAID-5 Diabetes questionnaire 1 [x] All     []  []  []    []  4 10/25/21 CS  Pt states  and family as support       Prevention, detection & treatment of Chronic complications:    -Identify the prevention, detection and treatment for complications including immunizations, preventive eye, foot, dental and renal exams as indicated per the participant's duration of diabetes and health status.  -Define the natural course of diabetes and the relationship of blood glucose levels to long term complications of diabetes.   1 [x] All     []            []  4    Prevention, detection & treatment of acute complications:    -State the causes,signs & symptoms of hyper & hypoglycemia, and prevention & treatment strategies.   -Describe sick day guidelines  DKA /indications for ketone testing &  when to call physician  1 [x] All     []      []    4 10/25/21 CS  Pt denies           -Identify severe weather/situation crisis  & diabetes supplies management  []      Using medications safely:   -State effects of diabetes medicines on blood glucose levels;  -List diabetes medication taken, action & side effects  [] All     []  []      Insulin/Injectables/glucagon  -Name appropriate injection sites; proper storage; supplies needed;     []       Demonstrate proper technique  []      Monitoring blood glucose, interpreting and using results:   -Identify the purpose of testing   -Identify recommended & personal blood glucose targets & HgbA1C target levels  -State the Importance of logging blood glucose levels for pattern recognition;   -State benefits of reading/using pt generated health data  -Verbalize safe lancet disposal 1 [x] All     []  []    []  []  []  4  10/25/21 CS  Log sheet provided. Pt states fasting values 79-81 mg/dl, 2 hours after meals 100-113 mg/dl. -Demonstrate proper testing technique  []      Incorporating physical activity into lifestyle:   -State effect of exercise on blood glucose levels;   -State benefits of regular exercise;   -Define safety considerations/food choices if needed.  -Describe contraindications/maintenance of activity.  1 [x] All     []  []    []  []  4 10/25/21 CS  Pt states walking 3-4 times/week for 30 minutes   Incorporating nutritional management into lifestyle:   -Describe effect of type, amount & timing of food on blood glucose  -Describe methods for preparing and planning healthy meals  -Correctly read food labels  -Name 3 foods high in Carbohydrate 1 [x] All       []    []    []  []  4 10/25/21 CS  Pt able to name carbohydrate foods and read food labels.    -Plan a carbohydrate-controlled meal based on individualized meal plan  -Demonstrate CHO counting/portion control  1 [x]  [x]  4 10/25/21 CS  Instructed patient on 1900 calorie carbohydrate controlled meal plan:   6 servings fat, 9 oz lean protein, 14 carbohydrate choices/day: 2 choices breakfast, am, pm, HS snack, 3 choices lunch and dinner. Pt able to plan menu items using meal planning food lists. Developing strategies for problem solving to promote health/change behavior. -Identify 7 self-care behaviors; Personal health risk factors; Benefits, challenges & strategies for behavioral change and set an individualized goal selection. 1       [x]  4  10/25/21 CS  [x]Nutrition  []Monitoring  []Exercise  []Medication  []Other     Identified Barriers to learning/adherence to self management plan:    None  []  other    Instruction Method:  Lecture/Discussion, Handouts and Return demonstration     Supporting Education Materials/Equipment Provided: Meal Plan and Gestational Pathway Booklet   []Norwegian materials       [] services     []Other:      Encounter Type Date Attended Start Time End Time Comments No Show Dates   Assessment 10/25/21  0930 0945       Session 1         Session 2        1:1 DSMES          In person Follow-up         Gestational Diabetes 10/25/21  0945 1010      Individual MNT        Meter Instrx        Insulin Instrx           Additional Comments: [] Pt seen individually due to Covid-19 Safety precautions and no group session available.         Date:   Follow-up goal attainment based on patients initial DSMES goal    Dr Notified by [] EMR []Fax        []Post class Hgb A1C  []Medication compliance   []Plate method/meal plan compliance   []Able to state the number of Carbohydrate servings eaten at B,L,D   []Testing blood glucose as prescribed by PCP   []Exercise Routine   []Other:   []Other:     []Patient lost to follow-up  Dr Notified by []EMR []Fax     Personal Support Plan:      [x] Keep all scheduled doctor appointments   [] Make and keep appointments with specialists (foot, eye, dentist) as recommended   [] Consult my pharmacist about all new medications or to ask any medication questions   [] Get tested for sleep apnea   [] Seek help for:   [] Make an appointment with:   [] Attend smoking cessation classes or call 1-800-QUIT-NOW  [] Attend Diabetes Support Group   [] Use diabetes magazines, books, or credible web-sites like the ADA for more information  [] Increase exercise at home or join an exercise program:   [] Other:

## 2021-10-25 NOTE — LETTER
[facility])  - Diabetes Education    10/25/2021     Re:     Sagrario Kelly  :  1990    Dear Dr. Andrew Colon,             Thank you for referring your patient, Sagrario Kelly, for diabetes education. Your patient was seen on 10/25/21 ,and has completed their personalized initial comprehensive education plan. The education plan included the following topics: Disease Process, Nutrition, Exercise, Blood Glucose Monitoring, Acute/chronic complications, Behavioral and Lifestyle Change, Healthy Coping and goal setting. The following services were also completed:    [x]  Diet instruction on: 1900 calorie carbohydrate controlled meal plan:  9 oz lean protein, 6 servings fat, 14 carbohydrate choices/day: 2 choices breakfast, am, pm, HS snack, 3 choices lunch and dinner. Upon completion of these sessions, the diabetes teaching team made the following evaluation of your patient's progress:          ASSESSMENT    [x]  very attentive to teaching  [x]  answered questions appropriately when asked  [x]  seems able to apply concepts to daily lifestyle  [x]  seems motivated to do well  [x]  verbalized an understanding of meal plan  [x]  expresses an intent to comply with meal plan  [x]  worked out meal timing adjustment according to work/schedule/lifestyle      GOAL  [x]  To count carbohydrate servings at each meal as instructed.   [x]  to measure blood glucose   []  to inject insulin in the stomach area  [x]  to log glucose results and bring to physicians office at each scheduled appointment      DIABETES SELF-MANAGEMENT SUPPORT PLAN/REFERRALS:    []  Prescription Assistance  []  Resource Mothers  []  [facility]  []  Financial Counselor  []    []  Dentist  []  Keep all scheduled Dr appointments  []  Smoking Cessation Classes    Thank you for referring this patient to our program.  Please do not hesitate to call if you have any questions

## 2021-11-10 ENCOUNTER — ANCILLARY PROCEDURE (OUTPATIENT)
Dept: OBGYN CLINIC | Age: 31
End: 2021-11-10
Payer: COMMERCIAL

## 2021-11-10 ENCOUNTER — ROUTINE PRENATAL (OUTPATIENT)
Dept: OBGYN CLINIC | Age: 31
End: 2021-11-10
Payer: COMMERCIAL

## 2021-11-10 VITALS
WEIGHT: 163 LBS | HEART RATE: 112 BPM | SYSTOLIC BLOOD PRESSURE: 131 MMHG | DIASTOLIC BLOOD PRESSURE: 88 MMHG | HEIGHT: 64 IN | BODY MASS INDEX: 27.83 KG/M2

## 2021-11-10 DIAGNOSIS — O24.410 DIET CONTROLLED GESTATIONAL DIABETES MELLITUS (GDM) IN THIRD TRIMESTER: Primary | ICD-10-CM

## 2021-11-10 DIAGNOSIS — Z3A.33 33 WEEKS GESTATION OF PREGNANCY: ICD-10-CM

## 2021-11-10 DIAGNOSIS — O09.813 PREGNANCY RESULTING FROM ASSISTED REPRODUCTIVE TECHNOLOGY, THIRD TRIMESTER: ICD-10-CM

## 2021-11-10 LAB
GLUCOSE URINE, POC: NEGATIVE
PROTEIN UA: NEGATIVE

## 2021-11-10 PROCEDURE — 81002 URINALYSIS NONAUTO W/O SCOPE: CPT | Performed by: OBSTETRICS & GYNECOLOGY

## 2021-11-10 PROCEDURE — 99212 OFFICE O/P EST SF 10 MIN: CPT | Performed by: OBSTETRICS & GYNECOLOGY

## 2021-11-10 PROCEDURE — 99213 OFFICE O/P EST LOW 20 MIN: CPT | Performed by: OBSTETRICS & GYNECOLOGY

## 2021-11-10 PROCEDURE — 76816 OB US FOLLOW-UP PER FETUS: CPT | Performed by: OBSTETRICS & GYNECOLOGY

## 2021-11-10 PROCEDURE — 76819 FETAL BIOPHYS PROFIL W/O NST: CPT | Performed by: OBSTETRICS & GYNECOLOGY

## 2021-11-10 NOTE — PATIENT INSTRUCTIONS
if you are sick, not feeling well or have an infectious process going on please reschedule your appointment by calling [phone]. Also if any family members are not feeling well, please do not bring them to your appointment. We appreciate your cooperation. We are doing this in order to protect our pregnant mothers+ their babies. Call your primary obstetrician with bleeding, leaking of fluid, abdominal tenderness, headache, blurry vision, epigastric pain and increased urinary frequency. If you are experiencing an emergency and need immediate help, call 911 or go to go emergency room or labor and delivery. Please arrive for your scheduled appointment at least 15 minutes early with your actual insurance card+ a photo ID. Also if you need any refills ordered or have questions, it may take up 48 hours to reply. Please allow ample time for your refills. Call me when you use last refill. Thank you for your cooperation. You might be having an NST at your next appt. Please eat a large snack or breakfast before coming to office. Thank Christy Alves questions contact Angelia Ozuna at [phone]. Patient Education        Weeks 32 to 29 of Your Pregnancy: Care Instructions  Overview     During the last few weeks of your pregnancy, you may have more aches and pains. It's important to rest when you can. Your growing baby is putting more pressure on your bladder. So you may need to urinate more often. Hemorrhoids are also common. These are painful, itchy veins in the rectal area. You may want to talk with your doctor about banking your baby's umbilical cord blood. This is the blood left in the cord after birth. If you want to save this blood, you must arrange it ahead of time. You can't decide at the last minute. If you haven't already had the Tdap shot during this pregnancy, talk to your doctor about getting it. It will help protect your  against pertussis infection. Follow-up care is a key part of your treatment and safety.  Be [employer] account. Enter P175 in the [facility] box to learn more about \"Weeks 32 to 34 of Your Pregnancy: Care Instructions. \"     If you do not have an account, please click on the \"Sign Up Now\" link. Current as of: June 16, 2021               Content Version: 13.0  © 6185-9474 [employer]. Care instructions adapted under license by [facility] ([facility]). If you have questions about a medical condition or this instruction, always ask your healthcare professional. [place] [address] any warranty or liability for your use of this information. Patient Education        Learning About When to Call Your Doctor During Pregnancy (After 20 Weeks)  Overview  It's common to have concerns about what might be a problem when you're pregnant. Most pregnancies don't have any serious problems. But it's still important to know when to call your doctor if you have certain symptoms or signs of labor. These are general suggestions. Your doctor may give you some more information about when to call. When to call your doctor (after 20 weeks)  Call 911  anytime you think you may need emergency care. For example, call if:  · You have severe vaginal bleeding. · You have sudden, severe pain in your belly. · You passed out (lost consciousness). · You have a seizure. · You see or feel the umbilical cord. · You think you are about to deliver your baby and can't make it safely to the hospital.  Call your doctor now or seek immediate medical care if:  · You have vaginal bleeding. · You have belly pain. · You have a fever. · You have symptoms of preeclampsia, such as:  ? Sudden swelling of your face, hands, or feet. ? New vision problems (such as dimness, blurring, or seeing spots). ? A severe headache. · You have a sudden release of fluid from your vagina. (You think your water broke.)  · You think that you may be in labor.  This means that you've had at least 6 contractions in an hour.  · You notice that your baby has stopped moving or is moving much less than normal.  · You have symptoms of a urinary tract infection. These may include:  ? Pain or burning when you urinate. ? A frequent need to urinate without being able to pass much urine. ? Pain in the flank, which is just below the rib cage and above the waist on either side of the back. ? Blood in your urine. Watch closely for changes in your health, and be sure to contact your doctor if:  · You have vaginal discharge that smells bad. · You have skin changes, such as:  ? A rash. ? Itching. ? Yellow color to your skin. · You have other concerns about your pregnancy. If you have labor signs at 37 weeks or more  If you have signs of labor at 37 weeks or more, your doctor may tell you to call when your labor becomes more active. Symptoms of active labor include:  · Contractions that are regular. · Contractions that are less than 5 minutes apart. · Contractions that are hard to talk through. Follow-up care is a key part of your treatment and safety. Be sure to make and go to all appointments, and call your doctor if you are having problems. It's also a good idea to know your test results and keep a list of the medicines you take. Where can you learn more? Go to https://PolatispePanvidea.Cortexa. org and sign in to your [employer] account. Enter  in the [facility] box to learn more about \"Learning About When to Call Your Doctor During Pregnancy (After 20 Weeks). \"     If you do not have an account, please click on the \"Sign Up Now\" link. Current as of: June 16, 2021               Content Version: 13.0  © 4996-6711 Healthwise[employer] Incorporated. Care instructions adapted under license by [facility] ([facility]). If you have questions about a medical condition or this instruction, always ask your healthcare professional. [address] any warranty or liability for your use of this information.          Patient Education        Counting Your Baby's Kicks: Care Instructions  Overview     Counting your baby's kicks is one way your doctor can tell that your baby is healthy. Most women--especially in a first pregnancy--feel their baby move for the first time between 16 and 22 weeks. The movement may feel like flutters rather than kicks. Your baby may move more at certain times of the day. When you are active, you may notice less kicking than when you are resting. At your prenatal visits, your doctor will ask whether the baby is active. In your last trimester, your doctor may ask you to count the number of times you feel your baby move. Follow-up care is a key part of your treatment and safety. Be sure to make and go to all appointments, and call your doctor if you are having problems. It's also a good idea to know your test results and keep a list of the medicines you take. How do you count fetal kicks? · A common method of checking your baby's movement is to note the length of time it takes to count ten movements (such as kicks, flutters, or rolls). · Pick your baby's most active time of day to count. This may be any time from morning to evening. · If you don't feel 10 movements in an hour, have something to eat or drink and count for another hour. If you don't feel at least 10 movements in the 2-hour period, call your doctor. When should you call for help? Call your doctor now or seek immediate medical care if:    · You noticed that your baby has stopped moving or is moving much less than normal.   Watch closely for changes in your health, and be sure to contact your doctor if you have any problems. Where can you learn more? Go to https://Exelisariela.[employer] Media. org and sign in to your [employer] account. Enter Y693 in the [employer] box to learn more about \"Counting Your Baby's Kicks: Care Instructions. \"     If you do not have an account, please click on the \"Sign Up Now\" link.   Current as of: June 16, 2021               Content Version: 13.0  © 8466-0324 Healthwise, Incorporated. Care instructions adapted under license by [facility] ([facility]). If you have questions about a medical condition or this instruction, always ask your healthcare professional. [address] any warranty or liability for your use of this information.

## 2021-11-10 NOTE — LETTER
11/10/21     RE:  Julian Grande   : 1990   AGE: 27 y.o. REFERRING PHYSICIAN:                    Migel Yung MD    Dear   This is a follow-up on Mrs. Julian Grande a 27 y.o.   who is seen today in our office. REASON FOR CONSULTATION:  · Follow-up on a pregnant patient with history of vaginal bleeding from placenta previa, and recent transfer of prenatal care to our office from [place]. MEDICATIONS:    Prenatal Vitamins    INTERVAL HISTORY:  Since her last visit to our office, Mrs. Julian Grande was diagnosed to have gestational diabetes for which reason she is being seen today. PHYSICAL EXAMINATION:  General Appearance:  Healthy looking, alert, no acute distress. Eyes:     No pallor, no icterus, no photophobia. Ears:     No ear drainage. Nose:     No nasal drainage, no paranasal sinus tenderness. Throat:   Mucosa moist, no oral thrush, no exudate. Neck:     No nuchal rigidity. Back:     No CVA tenderness. Abdomen:    Soft nontender. Extremities:    No pretibial pitting edema, no calf muscle tenderness. Skin:     No rashes, no lesions. BP: 131/88 Weight: 163 lb (73.9 kg) Height: 5' 4\" (162.6 cm) Pulse: 112     Body mass index is 27.98 kg/m². Urine dipstick:  Glucose : Negative   Albumin:  Negative       An ultrasound evaluation was done in our office today. Please refer to the enclosed copy of the ultrasound report for further information. Chart and Lab Work Review:    I reviewed with the patient the result of the:  · Three hour GTT collected on 10/20/2021 that ruled out gestational diabetes. Results for Michelle Cruz (MRN [id number])    Ref.  Range 10/20/2021 08:45   Glucose, Fasting Latest Units: mg/mL 84   Glucose, GTT - 1 Hour Latest Units: mg/dL 197   Glucose, GTT - 2 Hour Latest Units: mg/dL 169   Glucose, GTT - 3 Hour Latest Units: mg/dL 147       3 Hour ACOG Gestational Glucose Tolerance Guidelines   A positive diagnosis requires that 2 or more thresholds be   met or exceeded:     Fastin -  94 mg/dL   1 hour:    70 - 179 mg/dL   2 hour:    70 - 154 mg/dL   3 hour:    70 - 139 mg/dL       Review of her log book shows: Adequate sugar control with fasting sugars under 92 and 2hr pp under 120 mg/dl. IMPRESSION:  1. A  33w0d  intrauterine gestation. 2. Gestational diabetes  3. Placenta previa resolved. RECOMMENDATIONS/PLAN:  I discussed with the patient the following points:    1. The benefits and limitations of ultrasound in prenatal diagnosis. Some defects might not always be seen by ultrasound. Estimated incidence of these defects in the general population is 2- 4%. 2. No structural  anomalies are noted. Only genetic amniocentesis can rule out fetal chromosome anomalies. Normal ultrasound does not. 3. The size of her baby is appropriate for gestational age. 4. Her sugars are well controlled. She is to continue the management of her gestational diabetes with the ADA diet and continue testing her sugar fasting and 2 hours following each meal.  She is to bring her log book to our office next visit. 5. Poor sugar control results in an increased risk of developing  complications such as delayed maturation of the lungs, electrolyte imbalance, seizures, and jaundice. There is also an increased risk of delivering prematurely and an increased risk of having a large for date baby. 6. Fetal well-being was confirmed today. The amount of fluid around baby is normal.  The Biophysical profile score of 8/8 is reassuring, and the umbilical artery Doppler studies are normal.  7. She should monitor fetal well-being at home by counting movements after dinner. Her baby should  move 10 times in 2 hours; otherwise, she should call your office immediately. She is also to call, if she develops any headaches, blurred vision, abdominal pain, bleeding, or spotting, which are signs of preeclampsia.      8. She is to continue to follow with you in your office for ongoing obstetric care, continue to be monitored with nonstress test every 3 to 4 days for remainder of pregnancy. 9. I recommend follow-up ultrasound evaluation in our [facility] office in 3 weeks to check on fetal wellbeing anatomy and growth. Thank you again, doctor, for allowing us to be of service to your patient. If I can be of further assistance, please do not hesitate to call. Sincerely,        Michele Klein M.D., [place]      The total time in minutes spent reviewing medical records, reviewing imaging studies, performing ultrasonic imaging, reviewing laboratory testing, and documenting information was over 20  minutes, of which, 50% of the time was spent in patient education, counseling, and coordinating care with the patient, her provider, and/or her family. I answered all of her questions to her satisfaction. Current encounter billing:  NE OFFICE OUTPATIENT VISIT 10-19 MINUTES [[id number]]  US OB Follow Up Transabdominal Approach [RSH064 Custom]  US Fetal Biophysical Profile WO Non Stress Testing [16513 Custom]    **This report has been created using voice recognition software.  It may contain minor errors     which are inherent in voice recognition technology** O-T Plasty Text: The defect edges were debeveled with a #15 scalpel blade.  Given the location of the defect, shape of the defect and the proximity to free margins an O-T plasty was deemed most appropriate.  Using a sterile surgical marker, an appropriate O-T plasty was drawn incorporating the defect and placing the expected incisions within the relaxed skin tension lines where possible.    The area thus outlined was incised deep to adipose tissue with a #15 scalpel blade.  The skin margins were undermined to an appropriate distance in all directions utilizing iris scissors.

## 2021-11-10 NOTE — PROGRESS NOTES
11/10/21     RE:  Uzair Blake   : 1990   AGE: 27 y.o. REFERRING PHYSICIAN:                    Yuly Macias MD    Dear   This is a follow-up on Mrs. Uzair Blake a 27 y.o.   who is seen today in our office. REASON FOR CONSULTATION:  · Follow-up on a pregnant patient with history of vaginal bleeding from placenta previa, and recent transfer of prenatal care to our office from [place]. MEDICATIONS:    Prenatal Vitamins    INTERVAL HISTORY:  Since her last visit to our office, Mrs. Uzair Blake was diagnosed to have gestational diabetes for which reason she is being seen today. PHYSICAL EXAMINATION:  General Appearance:  Healthy looking, alert, no acute distress. Eyes:     No pallor, no icterus, no photophobia. Ears:     No ear drainage. Nose:     No nasal drainage, no paranasal sinus tenderness. Throat:   Mucosa moist, no oral thrush, no exudate. Neck:     No nuchal rigidity. Back:     No CVA tenderness. Abdomen:    Soft nontender. Extremities:    No pretibial pitting edema, no calf muscle tenderness. Skin:     No rashes, no lesions. BP: 131/88 Weight: 163 lb (73.9 kg) Height: 5' 4\" (162.6 cm) Pulse: 112     Body mass index is 27.98 kg/m². Urine dipstick:  Glucose : Negative   Albumin:  Negative       An ultrasound evaluation was done in our office today. Please refer to the enclosed copy of the ultrasound report for further information. Chart and Lab Work Review:    I reviewed with the patient the result of the:  · Three hour GTT collected on 10/20/2021 that ruled out gestational diabetes. Results for Agnes Henley (MRN [id number])    Ref.  Range 10/20/2021 08:45   Glucose, Fasting Latest Units: mg/mL 84   Glucose, GTT - 1 Hour Latest Units: mg/dL 197   Glucose, GTT - 2 Hour Latest Units: mg/dL 169   Glucose, GTT - 3 Hour Latest Units: mg/dL 147       3 Hour ACOG Gestational Glucose Tolerance Guidelines   A positive diagnosis requires that 2 or more thresholds be   met or exceeded:     Fastin -  94 mg/dL   1 hour:    70 - 179 mg/dL   2 hour:    70 - 154 mg/dL   3 hour:    70 - 139 mg/dL       Review of her log book shows: Adequate sugar control with fasting sugars under 92 and 2hr pp under 120 mg/dl. IMPRESSION:  1. A  33w0d  intrauterine gestation. 2. Gestational diabetes  3. Placenta previa resolved. RECOMMENDATIONS/PLAN:  I discussed with the patient the following points:    1. The benefits and limitations of ultrasound in prenatal diagnosis. Some defects might not always be seen by ultrasound. Estimated incidence of these defects in the general population is 2- 4%. 2. No structural  anomalies are noted. Only genetic amniocentesis can rule out fetal chromosome anomalies. Normal ultrasound does not. 3. The size of her baby is appropriate for gestational age. 4. Her sugars are well controlled. She is to continue the management of her gestational diabetes with the ADA diet and continue testing her sugar fasting and 2 hours following each meal.  She is to bring her log book to our office next visit. 5. Poor sugar control results in an increased risk of developing  complications such as delayed maturation of the lungs, electrolyte imbalance, seizures, and jaundice. There is also an increased risk of delivering prematurely and an increased risk of having a large for date baby. 6. Fetal well-being was confirmed today. The amount of fluid around baby is normal.  The Biophysical profile score of 8/8 is reassuring, and the umbilical artery Doppler studies are normal.  7. She should monitor fetal well-being at home by counting movements after dinner. Her baby should  move 10 times in 2 hours; otherwise, she should call your office immediately. She is also to call, if she develops any headaches, blurred vision, abdominal pain, bleeding, or spotting, which are signs of preeclampsia.      8. She is to continue to follow with you in your office for ongoing obstetric care, continue to be monitored with nonstress test every 3 to 4 days for remainder of pregnancy. 9. I recommend follow-up ultrasound evaluation in our [facility] office in 3 weeks to check on fetal wellbeing anatomy and growth. Thank you again, doctor, for allowing us to be of service to your patient. If I can be of further assistance, please do not hesitate to call. Sincerely,        Manpreet Vanegas M.D., [facility] [place]      The total time in minutes spent reviewing medical records, reviewing imaging studies, performing ultrasonic imaging, reviewing laboratory testing, and documenting information was over 20  minutes, of which, 50% of the time was spent in patient education, counseling, and coordinating care with the patient, her provider, and/or her family. I answered all of her questions to her satisfaction. Current encounter billing:  SD OFFICE OUTPATIENT VISIT 10-19 MINUTES [[id number]]  US OB Follow Up Transabdominal Approach [JAG465 Custom]  US Fetal Biophysical Profile WO Non Stress Testing [45400 Custom]    **This report has been created using voice recognition software.  It may contain minor errors     which are inherent in voice recognition technology**

## 2021-11-24 ENCOUNTER — FOLLOWUP TELEPHONE ENCOUNTER (OUTPATIENT)
Dept: DIABETES SERVICES | Age: 31
End: 2021-11-24

## 2021-11-24 NOTE — LETTER
[facility] Diabetes Education Follow-up Letter    GESTATIONAL FOLLOW-UP    Name: Angela Brantley  :   1990    Follow-up plan/Date:   2021               CONTACT POST CLASS REGARDING: Gestational Diabetes    Dear Dr. Ijeoma Pritchett,     Thank you for referring  Angela Brantley  to [facility] Diabetes Education Services. She has completed her personalized comprehensive education plan. The education plan included the following topics: Gestational Diabetes Disease Process, Nutrition, Exercise, Glucose Monitoring, Acute and Chronic Complication, Behavioral and Lifestyle Changes, Healthy Coping and goal setting. This letter is a follow-up to notify you how they are doing. The following area was chosen as their personal goal: [x] Healthy Eating   [] Being Active  [] Monitoring [] Problem Solving [] Taking Medication [] Healthy Coping  []Reducing Risks    Goal Outcome:    Patient states they met their goal % of time. Thank you for referring this patient to our program. Please do not hesitate to call if we can be of any more service to this patient.      Gina Bingham [facility] or [facility] [phone]    Sincerely,    [facility]) Diabetes Education Department   ADA Recognized Program

## 2021-11-24 NOTE — PROGRESS NOTES
Diabetes Self-Management Education Record    Participant Name: Aria   Referring Provider: Pardeep Chase MD  Assessment/Evaluation Ratings:  1=Needs Instruction   4=Demonstrates Understanding/Competency  2=Needs Review   NC=Not Covered    3=Comprehends Key Points  N/A=Not Applicable  Topics/Learning Objectives Pre-session Assess Date:  Instructor initials/date  10/25/21 CS Instr. Date    Instructor initials/date  10/25/21 CS Follow-up Post- session Eval Comments   Diabetes disease process & Treatment process:   -Define type of diabetes in simple terms.  - Describe the ABCs of  diabetes management  -Identify own type of diabetes  -Identify lifestyle changes/treatment options  -other:  1 [x] All     []  []  []  []  []  4 10/25/21 CS  Newly dx GDM, first pregnancy, 30 weeks, due 12/29/21   Developing strategies for Healthy coping/psychosocial issues:    -Describe feelings about living with diabetes  -Identify coping strategies and sources of stress  -Identify support needed & support network available  -Complete PAID-5 Diabetes questionnaire 1 [x] All     []  []  []    []  4 10/25/21 CS  Pt states  and family as support       Prevention, detection & treatment of Chronic complications:    -Identify the prevention, detection and treatment for complications including immunizations, preventive eye, foot, dental and renal exams as indicated per the participant's duration of diabetes and health status.  -Define the natural course of diabetes and the relationship of blood glucose levels to long term complications of diabetes.   1 [x] All     []            []  4    Prevention, detection & treatment of acute complications:    -State the causes,signs & symptoms of hyper & hypoglycemia, and prevention & treatment strategies.   -Describe sick day guidelines  DKA /indications for ketone testing &  when to call physician  1 [x] All     []      []    4 10/25/21 CS  Pt denies           -Identify severe 3 choices lunch and dinner. Pt able to plan menu items using meal planning food lists. Developing strategies for problem solving to promote health/change behavior. -Identify 7 self-care behaviors; Personal health risk factors; Benefits, challenges & strategies for behavioral change and set an individualized goal selection. 1       [x]  4  10/25/21 CS  [x]Nutrition  []Monitoring  []Exercise  []Medication  []Other     Identified Barriers to learning/adherence to self management plan:    None  []  other    Instruction Method:  Lecture/Discussion, Handouts and Return demonstration     Supporting Education Materials/Equipment Provided: Meal Plan and Gestational Pathway Booklet   []Liberian materials       [] services     []Other:      Encounter Type Date Attended Start Time End Time Comments No Show Dates   Assessment 10/25/21  0930 0945       Session 1         Session 2        1:1 DSMES          In person Follow-up         Gestational Diabetes 10/25/21  0945 1010      Individual MNT        Meter Instrx        Insulin Instrx           Additional Comments: [] Pt seen individually due to Covid-19 Safety precautions and no group session available.         Date:  11/24/21 CS Follow-up goal attainment based on patients initial DSMES goal.   Notified by [x] EMR []Fax        []Post class Hgb A1C  []Medication compliance   []Plate method/meal plan compliance   [x]Able to state the number of Carbohydrate servings eaten at B,L,D   []Testing blood glucose as prescribed by PCP   []Exercise Routine   []Other:   []Other:     []Patient lost to follow-up   Notified by []EMR []Fax     Personal Support Plan:      [x] Keep all scheduled doctor appointments   [] Make and keep appointments with specialists (foot, eye, dentist) as recommended   [] Consult my pharmacist about all new medications or to ask any medication questions   [] Get tested for sleep apnea   [] Seek help for:   [] Make an appointment with:   [] Attend smoking cessation classes or call 1-800-QUIT-NOW  [] Attend Diabetes Support Group   [] Use diabetes magazines, books, or credible web-sites like the ADA for more information  [] Increase exercise at home or join an exercise program:   [] Other:

## 2021-12-01 ENCOUNTER — ANCILLARY PROCEDURE (OUTPATIENT)
Dept: OBGYN CLINIC | Age: 31
End: 2021-12-01
Payer: COMMERCIAL

## 2021-12-01 ENCOUNTER — ROUTINE PRENATAL (OUTPATIENT)
Dept: OBGYN CLINIC | Age: 31
End: 2021-12-01
Payer: COMMERCIAL

## 2021-12-01 ENCOUNTER — HOSPITAL ENCOUNTER (EMERGENCY)
Age: 31
Discharge: HOME | End: 2021-12-01
Attending: EMERGENCY MEDICINE
Payer: COMMERCIAL

## 2021-12-01 VITALS
OXYGEN SATURATION: 98 % | BODY MASS INDEX: 28.34 KG/M2 | SYSTOLIC BLOOD PRESSURE: 136 MMHG | HEIGHT: 64 IN | WEIGHT: 166 LBS | TEMPERATURE: 97.9 F | HEART RATE: 91 BPM | RESPIRATION RATE: 16 BRPM | DIASTOLIC BLOOD PRESSURE: 92 MMHG

## 2021-12-01 VITALS
HEART RATE: 118 BPM | SYSTOLIC BLOOD PRESSURE: 129 MMHG | DIASTOLIC BLOOD PRESSURE: 89 MMHG | WEIGHT: 166 LBS | BODY MASS INDEX: 28.49 KG/M2

## 2021-12-01 DIAGNOSIS — Z3A.36 36 WEEKS GESTATION OF PREGNANCY: ICD-10-CM

## 2021-12-01 DIAGNOSIS — O24.410 DIET CONTROLLED GESTATIONAL DIABETES MELLITUS (GDM) IN THIRD TRIMESTER: ICD-10-CM

## 2021-12-01 DIAGNOSIS — G51.0 BELL'S PALSY: Primary | ICD-10-CM

## 2021-12-01 DIAGNOSIS — O09.813 PREGNANCY RESULTING FROM IN VITRO FERTILIZATION IN THIRD TRIMESTER: ICD-10-CM

## 2021-12-01 LAB
GLUCOSE URINE, POC: NEGATIVE
PROTEIN UA: NEGATIVE

## 2021-12-01 PROCEDURE — 81002 URINALYSIS NONAUTO W/O SCOPE: CPT | Performed by: OBSTETRICS & GYNECOLOGY

## 2021-12-01 PROCEDURE — 99213 OFFICE O/P EST LOW 20 MIN: CPT | Performed by: OBSTETRICS & GYNECOLOGY

## 2021-12-01 PROCEDURE — 99282 EMERGENCY DEPT VISIT SF MDM: CPT

## 2021-12-01 PROCEDURE — 76818 FETAL BIOPHYS PROFILE W/NST: CPT | Performed by: OBSTETRICS & GYNECOLOGY

## 2021-12-01 PROCEDURE — 99212 OFFICE O/P EST SF 10 MIN: CPT | Performed by: OBSTETRICS & GYNECOLOGY

## 2021-12-01 RX ORDER — CEPHALEXIN 125 MG/5ML
POWDER, FOR SUSPENSION ORAL 3 TIMES DAILY
COMMUNITY
End: 2021-12-01 | Stop reason: ALTCHOICE

## 2021-12-01 ASSESSMENT — ENCOUNTER SYMPTOMS
NAUSEA: 0
VOMITING: 0
CHEST TIGHTNESS: 0
SHORTNESS OF BREATH: 0
ABDOMINAL PAIN: 0

## 2021-12-01 ASSESSMENT — PAIN SCALES - GENERAL: PAINLEVEL_OUTOF10: 3

## 2021-12-01 ASSESSMENT — PAIN DESCRIPTION - ONSET: ONSET: SUDDEN

## 2021-12-01 ASSESSMENT — PAIN DESCRIPTION - LOCATION: LOCATION: HEAD

## 2021-12-01 ASSESSMENT — PAIN DESCRIPTION - PAIN TYPE: TYPE: ACUTE PAIN

## 2021-12-01 ASSESSMENT — PAIN DESCRIPTION - DESCRIPTORS: DESCRIPTORS: HEADACHE

## 2021-12-01 ASSESSMENT — PAIN DESCRIPTION - PROGRESSION: CLINICAL_PROGRESSION: NOT CHANGED

## 2021-12-01 ASSESSMENT — PAIN DESCRIPTION - FREQUENCY: FREQUENCY: CONTINUOUS

## 2021-12-01 NOTE — ED PROVIDER NOTES
28 yo female with concern for bells palsy. She started having what felt like ear discomfort on the right side several days ago. And then a couple of days ago she started noticing a little bit of weakness to the right side of her face. Now she has right-sided facial droop, inability to smile on the right side, inability to close her eye tightly on the right side, difficulty in raising the eyebrow on the right side. She is 36 weeks pregnant, follows with maternal-fetal medicine, slightly elevated blood pressure now. [facility] is following her for the blood pressure and they know it is elevated. She is awake calm alert, oriented x4. No distress, new problem, persistent, mild to moderate severity, few days duration. Family History   Problem Relation Age of Onset    Cancer Maternal Grandmother         breast     Past Surgical History:   Procedure Laterality Date    BREAST SURGERY Bilateral 12/22/2016    exchange bilateral breast silicone implant    CERVICAL POLYP REMOVAL      FOOT SURGERY      planters warts    OTHER SURGICAL HISTORY Bilateral 12/14/15    breast augmentationwith placement silicone implants    TONSILLECTOMY         Review of Systems   Respiratory: Negative for chest tightness and shortness of breath. Cardiovascular: Negative for chest pain. Gastrointestinal: Negative for abdominal pain, nausea and vomiting. Neurological: Positive for facial asymmetry. Negative for seizures and speech difficulty. All other systems reviewed and are negative. Physical Exam  Constitutional:       General: She is not in acute distress. Appearance: She is well-developed. HENT:      Head: Normocephalic and atraumatic. Eyes:      Conjunctiva/sclera: Conjunctivae normal.      Pupils: Pupils are equal, round, and reactive to light. Neck:      Thyroid: No thyromegaly. Cardiovascular:      Rate and Rhythm: Normal rate and regular rhythm.    Pulmonary:      Effort: Pulmonary effort is normal. No respiratory distress. Breath sounds: Normal breath sounds. Abdominal:      General: There is no distension. Palpations: Abdomen is soft. Tenderness: There is no abdominal tenderness. There is no guarding or rebound. Musculoskeletal:         General: No tenderness. Normal range of motion. Cervical back: Normal range of motion. Skin:     General: Skin is warm and dry. Findings: No erythema. Neurological:      Mental Status: She is alert and oriented to person, place, and time. Cranial Nerves: No cranial nerve deficit. Coordination: Coordination normal.      Comments: Difficulty raising the right eyebrow, difficulty closing and opening the right eye, right-sided facial droop all consistent with Bell's palsy   Psychiatric:         Mood and Affect: Mood normal.          Procedures     MDM          --------------------------------------------- PAST HISTORY ---------------------------------------------  Past Medical History:  has a past medical history of Diet controlled gestational diabetes mellitus (GDM) in third trimester. Past Surgical History:  has a past surgical history that includes Tonsillectomy; Foot surgery; other surgical history (Bilateral, 12/14/15); Breast surgery (Bilateral, 12/22/2016); and Cervical polyp removal.    Social History:  reports that she has never smoked. She has never used smokeless tobacco. She reports current alcohol use. She reports that she does not use drugs. Family History: family history includes Cancer in her maternal grandmother. The patients home medications have been reviewed. Allergies: Patient has no known allergies. -------------------------------------------------- RESULTS -------------------------------------------------  Labs:  No results found for this visit on 12/01/21.     Radiology:  No orders to display       ------------------------- NURSING NOTES AND VITALS REVIEWED ---------------------------  Date / Time Roomed:  12/1/2021  5:25 PM  ED Bed Assignment:  05/05    The nursing notes within the ED encounter and vital signs as below have been reviewed. BP (!) 136/92   Pulse 91   Temp 97.9 °F (36.6 °C) (Infrared)   Resp 16   Ht 5' 4\" (1.626 m)   Wt 166 lb (75.3 kg)   LMP 03/24/2021   SpO2 98%   BMI 28.49 kg/m²   Oxygen Saturation Interpretation: Normal      ------------------------------------------ PROGRESS NOTES ------------------------------------------  I have spoken with the patient and father and discussed todays results, in addition to providing specific details for the plan of care and counseling regarding the diagnosis and prognosis. Their questions are answered at this time and they are agreeable with the plan. I discussed at length with them reasons for immediate return here for re evaluation. They will followup with primary care by calling their office tomorrow. Patient with Bell's palsy. She will follow-up with maternal-fetal medicine for her blood pressure. She understands to tape her eye closed at night to prevent irritation and problems. She is too close to her delivery to start on steroids and were several days out so the antiviral treatment most likely is not effective at this point. After informed consent and shared decision making we will not pursue treatment and will wait while this Bell's palsy takes its course.  --------------------------------- ADDITIONAL PROVIDER NOTES ---------------------------------  At this time the patient is without objective evidence of an acute process requiring hospitalization or inpatient management. They have remained hemodynamically stable throughout their entire ED visit and are stable for discharge with outpatient follow-up. The plan has been discussed in detail and they are aware of the specific conditions for emergent return, as well as the importance of follow-up. Current Discharge Medication List          Diagnosis:  1.  Bell's palsy Disposition:  Patient's disposition: Discharge to home  Patient's condition is stable.               Sebas Rawls DO  12/01/21 175

## 2021-12-01 NOTE — PATIENT INSTRUCTIONS
Patient Education        Learning About When to Call Your Doctor During Pregnancy (After 20 Weeks)  Overview  It's common to have concerns about what might be a problem when you're pregnant. Most pregnancies don't have any serious problems. But it's still important to know when to call your doctor if you have certain symptoms or signs of labor. These are general suggestions. Your doctor may give you some more information about when to call. When to call your doctor (after 20 weeks)  Call 911  anytime you think you may need emergency care. For example, call if:  · You have severe vaginal bleeding. · You have sudden, severe pain in your belly. · You passed out (lost consciousness). · You have a seizure. · You see or feel the umbilical cord. · You think you are about to deliver your baby and can't make it safely to the hospital.  Call your doctor now or seek immediate medical care if:  · You have vaginal bleeding. · You have belly pain. · You have a fever. · You have symptoms of preeclampsia, such as:  ? Sudden swelling of your face, hands, or feet. ? New vision problems (such as dimness, blurring, or seeing spots). ? A severe headache. · You have a sudden release of fluid from your vagina. (You think your water broke.)  · You think that you may be in labor. This means that you've had at least 6 contractions in an hour. · You notice that your baby has stopped moving or is moving much less than normal.  · You have symptoms of a urinary tract infection. These may include:  ? Pain or burning when you urinate. ? A frequent need to urinate without being able to pass much urine. ? Pain in the flank, which is just below the rib cage and above the waist on either side of the back. ? Blood in your urine. Watch closely for changes in your health, and be sure to contact your doctor if:  · You have vaginal discharge that smells bad. · You have skin changes, such as:  ? A rash. ? Itching.   ? Yellow color to your skin. · You have other concerns about your pregnancy. If you have labor signs at 37 weeks or more  If you have signs of labor at 37 weeks or more, your doctor may tell you to call when your labor becomes more active. Symptoms of active labor include:  · Contractions that are regular. · Contractions that are less than 5 minutes apart. · Contractions that are hard to talk through. Follow-up care is a key part of your treatment and safety. Be sure to make and go to all appointments, and call your doctor if you are having problems. It's also a good idea to know your test results and keep a list of the medicines you take. Where can you learn more? Go to [email or web address]. org and sign in to your [employer] account. Enter  in the [employer] box to learn more about \"Learning About When to Call Your Doctor During Pregnancy (After 20 Weeks). \"     If you do not have an account, please click on the \"Sign Up Now\" link. Current as of: June 16, 2021               Content Version: 13.0  © 5105-9234 Healthwise, Incorporated. Care instructions adapted under license by [facility] ([facility]). If you have questions about a medical condition or this instruction, always ask your healthcare professional. [address] any warranty or liability for your use of this information.

## 2021-12-01 NOTE — PROGRESS NOTES
21    Thomasena Ahumada, MD  [address] [facility], [address] ,  [facility] [address]     RE:  Rei Osorio DFGYO  : 1990   AGE: 27 y.o. This report has been created using voice recognition software. It may contain errors which are inherent in voice recognition technology. Dear Dr. Sofy Santoro:    Katie Truong had an appointment today for the following indications:    Patient Active Problem List   Diagnosis    Pregnancy resulting from in vitro fertilization in third trimester    Diet controlled gestational diabetes mellitus (GDM) in third trimester     Katie Truong is a 27 y.o. female, who is G1(0). She has an Estimated Date of Delivery: 21 based on her previous ultrasound assessment. She is currently 36 weeks 0 days gestation based on that assessment. The patient is at increased risk for having a fetus with congenital abnormality secondary to the history of IVF/ICSI. In a recent study published 2017 in [place], the incidence of congenital heart defects using the [address] was approximately 5% compared to 3% in the control population. In twin intrauterine pregnancies established by IVF, the incidence of congenital heart disease is significantly higher. Defects in the urogenital system, improving disorders and genetic syndromes, hypospadias, VACTERAL syndrome, oculoauriculovertebral spectrum, and others have been associated with IVF/ICSI. The patient has gestational diabetes. Her blood sugars have been well controlled with diet.                      GENETIC SCREENING/TERATOLOGY COUNSELING                  (Includes patient, FTB, and any affected family members)    Patient Age > 35 Years NO   Thalassemia ( MVC<80) NO   Congential Heart Defect NO   Neural Tube Defect NO   Barron-Sachs NO   Sickle Cell Disease NO   Sickle Cell Trait NO   Sickle C Disease or Trait NO   Hemophilia NO   Muscular Dystrophy NO   Cystic Fibrosis NO Phelps Disease NO   Autism NO   Mental Retardation NO   History of Fragile X NO   Maternal Diabetes NO   Other Genetic Disease or Syndrome NO   Previous Child With Congenital Abnormality Not Listed NO   Recreational Drugs NO                                        INFECTION HISTORY     HEPATITIS IMMUNIZED  YES   HEPATITIS INFECTION  NO   EXPOSURE TO TB NO   PARVOVIRUS B-19 NO   CHICKEN POX  YES   MEASLES NO   HIV NO   OTHER RASH OR VIRAL ILLNESS SINCE LMP NO   UTI RECURRENT NO   HPV NO     OB History    Para Term  AB Living   1             SAB IAB Ectopic Molar Multiple Live Births                    # Outcome Date GA Lbr Leon/2nd Weight Sex Delivery Anes PTL Lv   1 Current              PAST GYNECOLOGICAL  HISTORY:  Negative for abnormal pap smears. Negative for sexually transmitted diseases. Negative for cervical LEEP / conization /cryosurgery. Negative for uterine surgery. Negative for ovarian or tubal surgery. Past Medical History:   Diagnosis Date    Diet controlled gestational diabetes mellitus (GDM) in third trimester 2021       Past Surgical History:   Procedure Laterality Date    BREAST SURGERY Bilateral 2016    exchange bilateral breast silicone implant    CERVICAL POLYP REMOVAL      FOOT SURGERY      planters warts    OTHER SURGICAL HISTORY Bilateral 12/14/15    breast augmentationwith placement silicone implants    TONSILLECTOMY       No Known Allergies    Current Outpatient Medications:     cephALEXin (KEFLEX) 125 MG/5ML suspension, Take by mouth 3 times daily    Prenatal MV-Min-Fe Fum-FA-DHA (PRENATAL 1 PO), Take by mouth    Social History     Tobacco Use    Smoking status: Never Smoker    Smokeless tobacco: Never Used   Substance Use Topics    Alcohol use: Yes     Alcohol/week: 0.0 standard drinks     Comment: social     FAMILY MEDICAL HISTORY:   Negative for congenital abnormalities, autism, genetic disease and mental retardation, not listed above. Review of Systems :   CONSTITUTIONAL : No fever, no chills   HEENT : No headache, no visual changes, no rhinorrhea, no sore throat   CARDIOVASCULAR : No pain, no palpitations, no edema   RESPIRATORY : No pain, no shortness of breath   GASTROINTESTINAL : No N/V, no D/C, no abdominal pain   GENITOURINARY : No dysuria, hematuria and no incontinence   MUSCULOSKELETAL : No myalgia, No back pain  NEUROLOGICAL : No numbness, no tingling, no tremors. No history of seizures  ALL OTHER SYSTEMS WERE REPORTED AS NEGATIVE. PERTINENT PHYSICAL EXAMINATION:   /89   Pulse 118   Wt 166 lb (75.3 kg)   LMP 2021   BMI 28.49 kg/m²   Urine dipstick:   Negative for Glucose    Negative for Albumin    GENERAL:   The patient is a well developed, female who is alert cooperative and oriented times three in no acute distress. HEENT:  Normo cephalic and atraumatic. No facial edema. ABDOMEN:   Her uterus is gravid. She had no complaint of abdominal pain or tenderness. The fetal heart rate is 138 bpm. The fetus is in the cephalic presentation which was confirmed by the ultrasound assessment. EXTREMITIES:  No peripheral edema is noted. IMPRESSION:  1.  IUP at 36 weeks 0 days gestation based on her Estimated Date of Delivery: 21  2. IVF   3.  GDM (A1)  4. History of placenta previa, resolved  5. Reassuring biophysical profile and cord Doppler testing 2021  6. Estimated fetal weight was at the 59th growth percentile 2021    PLAN:  I would recommend that the patient count fetal movements and call if she notices any subjective decrease in fetal movements, particularly if there are less than 10 major movements in an hour. Non-stress testing should be performed every 3 to 4 days through the balance of the pregnancy. Serial ultrasounds to assess fetal anatomy and growth should be performed. The patient is at increase risk for  morbidity and mortality secondary to her history.  Weekly BPP and cord Doppler testing should be performed, unless there is a clinical indication to perform the testing more frequently. The patient was advised to call if she has any increased vaginal discharge, vaginal bleeding, contractions, abdominal pain, back pain or any new significant symptomatology prior to her next visit. I advised her that these are signs and symptoms of cervical change and require follow-up assessment when they occur. I requested the patient return for a follow-up assessment in 2 weeks unless there is a clinical reason for her to return prior to that time. She is to call if she has any problems or questions prior to her next visit. Further evaluation and management will be dependent on her clinical presentation and the results of her testing. The patient is to continue to follow with you in your office for ongoing obstetric care. The total time in minutes spent with the patient, reviewing medical records, reviewing imaging studies, performing ultrasonic imaging, reviewing laboratory testing, and documenting information was 15 minutes, of which, 50% of the time was spent in patient education, counseling, and coordinating care with the patient, her provider, and/or her family. Available electronic and paper medical records were reviewed. I discussed with the patient results of her fetal ultrasound assessment performed today including the limitations of the testing. I answered all of her questions to her satisfaction. I asked her to call if she had any additional questions prior to her next visit. If you have any questions regarding her management, please contact me at your convenience and thank you for allowing me to participate in her care.     Sincerely,        Maria C Hammer MD, MS, Lavonne Balbuena, VISHNU, RDMS, RVT  Director [address] [place] [address]  [phone]

## 2021-12-02 ENCOUNTER — TELEPHONE (OUTPATIENT)
Dept: SURGERY | Age: 31
End: 2021-12-02

## 2021-12-02 NOTE — TELEPHONE ENCOUNTER
Pt called stating she is 39 weeks pregnany with [place] Palsy, do you have any recommendations for PT or any other treatments for her at this point?

## 2021-12-10 ENCOUNTER — HOSPITAL ENCOUNTER (OUTPATIENT)
Age: 31
Discharge: HOME | End: 2021-12-10
Attending: OBSTETRICS & GYNECOLOGY | Admitting: OBSTETRICS & GYNECOLOGY
Payer: COMMERCIAL

## 2021-12-10 ENCOUNTER — ANCILLARY PROCEDURE (OUTPATIENT)
Dept: OBGYN CLINIC | Age: 31
End: 2021-12-10
Payer: COMMERCIAL

## 2021-12-10 ENCOUNTER — ROUTINE PRENATAL (OUTPATIENT)
Dept: OBGYN CLINIC | Age: 31
End: 2021-12-10
Payer: COMMERCIAL

## 2021-12-10 VITALS
BODY MASS INDEX: 28.17 KG/M2 | HEART RATE: 93 BPM | WEIGHT: 165 LBS | TEMPERATURE: 97.9 F | HEIGHT: 64 IN | SYSTOLIC BLOOD PRESSURE: 137 MMHG | DIASTOLIC BLOOD PRESSURE: 90 MMHG | RESPIRATION RATE: 16 BRPM

## 2021-12-10 VITALS
WEIGHT: 165.25 LBS | DIASTOLIC BLOOD PRESSURE: 91 MMHG | BODY MASS INDEX: 28.37 KG/M2 | HEART RATE: 125 BPM | SYSTOLIC BLOOD PRESSURE: 134 MMHG

## 2021-12-10 DIAGNOSIS — Z3A.37 37 WEEKS GESTATION OF PREGNANCY: ICD-10-CM

## 2021-12-10 DIAGNOSIS — O16.3 ELEVATED BLOOD PRESSURE AFFECTING PREGNANCY IN THIRD TRIMESTER, ANTEPARTUM: Primary | ICD-10-CM

## 2021-12-10 DIAGNOSIS — O09.813 PREGNANCY RESULTING FROM IN VITRO FERTILIZATION IN THIRD TRIMESTER: ICD-10-CM

## 2021-12-10 DIAGNOSIS — O24.410 DIET CONTROLLED GESTATIONAL DIABETES MELLITUS (GDM) IN THIRD TRIMESTER: ICD-10-CM

## 2021-12-10 LAB
ALBUMIN SERPL-MCNC: 3.5 G/DL (ref 3.5–5.2)
ALP BLD-CCNC: 226 U/L (ref 35–104)
ALT SERPL-CCNC: 21 U/L (ref 0–32)
ANION GAP SERPL CALCULATED.3IONS-SCNC: 12 MMOL/L (ref 7–16)
APTT: 25.5 SEC (ref 24.5–35.1)
AST SERPL-CCNC: 27 U/L (ref 0–31)
BACTERIA: ABNORMAL /HPF
BILIRUB SERPL-MCNC: 0.3 MG/DL (ref 0–1.2)
BILIRUBIN URINE: NEGATIVE
BLOOD, URINE: NEGATIVE
BUN BLDV-MCNC: 11 MG/DL (ref 6–20)
CALCIUM SERPL-MCNC: 9.2 MG/DL (ref 8.6–10.2)
CHLORIDE BLD-SCNC: 100 MMOL/L (ref 98–107)
CLARITY: CLEAR
CO2: 23 MMOL/L (ref 22–29)
COLOR: YELLOW
CREAT SERPL-MCNC: 0.8 MG/DL (ref 0.5–1)
CREATININE URINE: 70 MG/DL (ref 29–226)
D DIMER: 655 NG/ML DDU
EPITHELIAL CELLS, UA: ABNORMAL /HPF
FIBRINOGEN: 686 MG/DL (ref 225–540)
GFR AFRICAN AMERICAN: >60
GFR NON-AFRICAN AMERICAN: >60 ML/MIN/1.73
GLUCOSE BLD-MCNC: 85 MG/DL (ref 74–99)
GLUCOSE URINE, POC: NEGATIVE
GLUCOSE URINE: NEGATIVE MG/DL
HCT VFR BLD CALC: 40.1 % (ref 34–48)
HEMOGLOBIN: 13.3 G/DL (ref 11.5–15.5)
INR BLD: 0.9
KETONES, URINE: NEGATIVE MG/DL
LEUKOCYTE ESTERASE, URINE: ABNORMAL
MCH RBC QN AUTO: 31.2 PG (ref 26–35)
MCHC RBC AUTO-ENTMCNC: 33.2 % (ref 32–34.5)
MCV RBC AUTO: 94.1 FL (ref 80–99.9)
NITRITE, URINE: NEGATIVE
PDW BLD-RTO: 12.1 FL (ref 11.5–15)
PH UA: 5.5 (ref 5–9)
PLATELET # BLD: 195 E9/L (ref 130–450)
PMV BLD AUTO: 13.4 FL (ref 7–12)
POTASSIUM SERPL-SCNC: 4 MMOL/L (ref 3.5–5)
PROTEIN PROTEIN: 10 MG/DL (ref 0–12)
PROTEIN UA: NEGATIVE
PROTEIN UA: NEGATIVE MG/DL
PROTEIN/CREAT RATIO: 0.1
PROTEIN/CREAT RATIO: 0.1 (ref 0–0.2)
PROTHROMBIN TIME: 9.7 SEC (ref 9.3–12.4)
RBC # BLD: 4.26 E12/L (ref 3.5–5.5)
RBC UA: ABNORMAL /HPF (ref 0–2)
SODIUM BLD-SCNC: 135 MMOL/L (ref 132–146)
SPECIFIC GRAVITY UA: 1.02 (ref 1–1.03)
TOTAL PROTEIN: 6.9 G/DL (ref 6.4–8.3)
URIC ACID, SERUM: 5.8 MG/DL (ref 2.4–5.7)
UROBILINOGEN, URINE: 0.2 E.U./DL
WBC # BLD: 8.5 E9/L (ref 4.5–11.5)
WBC UA: ABNORMAL /HPF (ref 0–5)

## 2021-12-10 PROCEDURE — 85027 COMPLETE CBC AUTOMATED: CPT

## 2021-12-10 PROCEDURE — 84550 ASSAY OF BLOOD/URIC ACID: CPT

## 2021-12-10 PROCEDURE — 99202 OFFICE O/P NEW SF 15 MIN: CPT

## 2021-12-10 PROCEDURE — 85378 FIBRIN DEGRADE SEMIQUANT: CPT

## 2021-12-10 PROCEDURE — 81001 URINALYSIS AUTO W/SCOPE: CPT

## 2021-12-10 PROCEDURE — 0502F SUBSEQUENT PRENATAL CARE: CPT | Performed by: ADVANCED PRACTICE MIDWIFE

## 2021-12-10 PROCEDURE — 36415 COLL VENOUS BLD VENIPUNCTURE: CPT

## 2021-12-10 PROCEDURE — 99212 OFFICE O/P EST SF 10 MIN: CPT | Performed by: OBSTETRICS & GYNECOLOGY

## 2021-12-10 PROCEDURE — 81002 URINALYSIS NONAUTO W/O SCOPE: CPT | Performed by: OBSTETRICS & GYNECOLOGY

## 2021-12-10 PROCEDURE — 82570 ASSAY OF URINE CREATININE: CPT

## 2021-12-10 PROCEDURE — 6360000002 HC RX W HCPCS: Performed by: OBSTETRICS & GYNECOLOGY

## 2021-12-10 PROCEDURE — 99213 OFFICE O/P EST LOW 20 MIN: CPT | Performed by: OBSTETRICS & GYNECOLOGY

## 2021-12-10 PROCEDURE — 76818 FETAL BIOPHYS PROFILE W/NST: CPT | Performed by: OBSTETRICS & GYNECOLOGY

## 2021-12-10 PROCEDURE — 85610 PROTHROMBIN TIME: CPT

## 2021-12-10 PROCEDURE — 85384 FIBRINOGEN ACTIVITY: CPT

## 2021-12-10 PROCEDURE — 76820 UMBILICAL ARTERY ECHO: CPT | Performed by: OBSTETRICS & GYNECOLOGY

## 2021-12-10 PROCEDURE — 84156 ASSAY OF PROTEIN URINE: CPT

## 2021-12-10 PROCEDURE — 80053 COMPREHEN METABOLIC PANEL: CPT

## 2021-12-10 PROCEDURE — 85730 THROMBOPLASTIN TIME PARTIAL: CPT

## 2021-12-10 RX ORDER — ASPIRIN 81 MG/1
81 TABLET, CHEWABLE ORAL DAILY
Status: ON HOLD | COMMUNITY
End: 2021-12-16 | Stop reason: HOSPADM

## 2021-12-10 RX ORDER — VALACYCLOVIR HYDROCHLORIDE 1 G/1
500 TABLET, FILM COATED ORAL 2 TIMES DAILY
COMMUNITY
End: 2022-07-07

## 2021-12-10 RX ORDER — BETAMETHASONE SODIUM PHOSPHATE AND BETAMETHASONE ACETATE 3; 3 MG/ML; MG/ML
12 INJECTION, SUSPENSION INTRA-ARTICULAR; INTRALESIONAL; INTRAMUSCULAR; SOFT TISSUE ONCE
Status: DISCONTINUED | OUTPATIENT
Start: 2021-12-11 | End: 2021-12-10 | Stop reason: HOSPADM

## 2021-12-10 RX ORDER — BETAMETHASONE SODIUM PHOSPHATE AND BETAMETHASONE ACETATE 3; 3 MG/ML; MG/ML
INJECTION, SUSPENSION INTRA-ARTICULAR; INTRALESIONAL; INTRAMUSCULAR; SOFT TISSUE
Status: DISPENSED
Start: 2021-12-10 | End: 2021-12-11

## 2021-12-10 RX ORDER — BETAMETHASONE SODIUM PHOSPHATE AND BETAMETHASONE ACETATE 3; 3 MG/ML; MG/ML
12 INJECTION, SUSPENSION INTRA-ARTICULAR; INTRALESIONAL; INTRAMUSCULAR; SOFT TISSUE ONCE
Status: COMPLETED | OUTPATIENT
Start: 2021-12-10 | End: 2021-12-10

## 2021-12-10 RX ADMIN — BETAMETHASONE SODIUM PHOSPHATE AND BETAMETHASONE ACETATE 12 MG: 3; 3 INJECTION, SUSPENSION INTRA-ARTICULAR; INTRALESIONAL; INTRAMUSCULAR at 13:13

## 2021-12-10 NOTE — PROGRESS NOTES
Dr. Paty Jett updated on lab results and blood pressures. Areli Reed given to discharge to home and follow up on Monday in the office.

## 2021-12-10 NOTE — H&P
Department of Obstetrics and Gynecology  Labor and Delivery  Triage Note      SUBJECTIVE:  Julian Grande is a 27 y.o. female, , Patient's last menstrual period was 2021., Estimated Date of Delivery: 21, 37w2d, here with the complaint of elevated BP in office at [facility]. BP was in the 130/90s. Pt admits HA. She does have some blurred vision out of her right eye however this is unchanged since the onset of Bell's Palsy. Prenatal course: right sided Bell's Palsy, GDMA1, IVF pregnancy    PAST OB HISTORY  OB History        1    Para        Term                AB        Living           SAB        IAB        Ectopic        Molar        Multiple        Live Births                    Past Medical History:        Diagnosis Date    Diet controlled gestational diabetes mellitus (GDM) in third trimester 2021     Past Surgical History:        Procedure Laterality Date    BREAST SURGERY Bilateral 2016    exchange bilateral breast silicone implant    CERVICAL POLYP REMOVAL      FOOT SURGERY      planters warts    OTHER SURGICAL HISTORY Bilateral 12/14/15    breast augmentationwith placement silicone implants    TONSILLECTOMY       Allergies:  Patient has no known allergies. Social History:    Social History     Socioeconomic History    Marital status:      Spouse name: Not on file    Number of children: Not on file    Years of education: Not on file    Highest education level: Not on file   Occupational History    Not on file   Tobacco Use    Smoking status: Never Smoker    Smokeless tobacco: Never Used   Vaping Use    Vaping Use: Never used   Substance and Sexual Activity    Alcohol use:  Yes     Alcohol/week: 0.0 standard drinks     Comment: social    Drug use: No    Sexual activity: Yes   Other Topics Concern    Not on file   Social History Narrative    Not on file     Social Determinants of Health     Financial Resource Strain:     Difficulty of Paying Living Expenses: Not on file   Food Insecurity:     Worried About [address] [employer] in the Last Year: Not on file    Jemima of Food in the Last Year: Not on file   Transportation Needs:     Lack of Transportation (Medical): Not on file    Lack of Transportation (Non-Medical):  Not on file   Physical Activity:     Days of Exercise per Week: Not on file    Minutes of Exercise per Session: Not on file   Stress:     Feeling of Stress : Not on file   Social Connections:     Frequency of Communication with Friends and Family: Not on file    Frequency of Social Gatherings with Friends and Family: Not on file    Attends Jewish Services: Not on file    Active Member of [address] [place] [address] or Organizations: Not on file    Attends Club or Organization Meetings: Not on file    Marital Status: Not on file   Intimate Partner Violence:     Fear of Current or Ex-Partner: Not on file    Emotionally Abused: Not on file    Physically Abused: Not on file    Sexually Abused: Not on file   Housing Stability:     Unable to Pay for Housing in the Last Year: Not on file    Number of [place] in the Last Year: Not on file    Unstable Housing in the Last Year: Not on file     Family History:       Problem Relation Age of Onset    Cancer Maternal Grandmother         breast     Medications Prior to Admission:  Medications Prior to Admission: valACYclovir (VALTREX) 1 g tablet, Take 500 mg by mouth 2 times daily  aspirin 81 MG chewable tablet, Take 81 mg by mouth daily  Prenatal MV-Min-Fe Fum-FA-DHA (PRENATAL 1 PO), Take by mouth    Review of Systems:   CONSTITUTIONAL:  negative  RESPIRATORY:  negative  CARDIOVASCULAR:  negative  GASTROINTESTINAL:  negative  ALLERGIC/IMMUNOLOGIC:  negative  NEUROLOGICAL:  negative  BEHAVIOR/PSYCH:  negative    OBJECTIVE    Vitals:  BP (!) 135/92   Pulse 93   Temp 97.9 °F (36.6 °C) (Oral)   Resp 16   Ht 5' 4\" (1.626 m)   Wt 165 lb (74.8 kg)   LMP 03/24/2021   BMI 28.32 kg/m²       General

## 2021-12-10 NOTE — PATIENT INSTRUCTIONS
Patient Education        Week 40 of Your Pregnancy: Care Instructions  Overview     You are near the end of your pregnancy--and you're probably pretty uncomfortable. It may be harder to walk around. Lying down probably isn't comfortable either. You may have trouble getting to sleep or staying asleep. Most babies are born between 40 and 41 weeks. This is a good time to think about packing a bag for the hospital with items you'll need. Then you'll be ready when labor starts. Follow-up care is a key part of your treatment and safety. Be sure to make and go to all appointments, and call your doctor if you are having problems. It's also a good idea to know your test results and keep a list of the medicines you take. How can you care for yourself at home? Learn about breastfeeding  · Breastfeeding is best for your baby and good for you. · Breast milk has antibodies to help your baby fight infections. · If you breastfeed, you may lose weight faster. That's because making milk burns calories. · Learning the best ways to hold your baby will make breastfeeding easier. · Sometimes breastfeeding can make partners feel left out. If you have a partner, plan how you can care for your baby together. For example, your partner can bathe and diaper the baby. You can snuggle together when you breastfeed. · You may want to learn how to use a breast pump and store your milk. · If you choose to bottle feed, make the feeding feel like breastfeeding so you can bond with your baby. Always hold your baby and the bottle. Don't prop bottles or let your baby fall asleep with a bottle. Learn about crying  · It's common for babies to cry for 1 to 3 hours a day. Some cry more, and some cry less. · Babies don't cry to make you upset or because you're a bad parent. · Crying is how your baby communicates. Your baby may be hungry; have gas; need a diaper change; or feel cold, warm, tired, lonely, or tense.  Sometimes babies cry for unknown reasons. · If you respond to your baby's needs, your baby will learn to trust you. · Try to stay calm when your baby cries. Your baby may get more upset if they sense that you are upset. Know how to care for your   · Your baby's umbilical cord stump will drop off on its own, usually between 1 and 2 weeks. To care for your baby's umbilical cord area:  ? Clean the area at the bottom of the cord 2 or 3 times a day. ? Pay special attention to the area where the cord attaches to the skin. ? Keep the diaper folded below the cord. ? Use a damp washcloth or cotton ball to sponge bathe your baby until the stump has come off. · Your baby's first dark stool is called meconium. After the meconium is passed, your baby will develop their own bowel pattern. ? Some babies, especially  babies, have several bowel movements a day. Others have one or two a day, or one every 2 to 3 days. ?  babies often have loose, yellow stools. Formula-fed babies have more formed stools. ? If your baby's stools look like little pellets, your baby is constipated. After 2 days of constipation, call your baby's doctor. · If your baby will be circumcised, you can care for your baby at home. ? Gently rinse your baby's penis with warm water after every diaper change. Don't try to remove the film that forms on the penis. This film will go away on its own. Pat dry. ? Put petroleum ointment, such as Vaseline, on the area of the diaper that will touch your baby's penis. This will keep the diaper from sticking to your baby. ? Ask the doctor about giving your baby acetaminophen (Tylenol) for pain. Where can you learn more? Go to https://Reveal TechnologypeleslieewDaojia.Smarty Ring. org and sign in to your [employer] account. Enter  in the Tigermed box to learn more about \"Week 37 of Your Pregnancy: Care Instructions. \"     If you do not have an account, please click on the \"Sign Up Now\" link.   Current as of:  16, 2021               Content Version: 13.0  © 2006-2021 [employer]. Care instructions adapted under license by [facility] ([facility]). If you have questions about a medical condition or this instruction, always ask your healthcare professional. [address] any warranty or liability for your use of this information. Patient Education        Learning About When to Call Your Doctor During Pregnancy (After 20 Weeks)  Overview  It's common to have concerns about what might be a problem when you're pregnant. Most pregnancies don't have any serious problems. But it's still important to know when to call your doctor if you have certain symptoms or signs of labor. These are general suggestions. Your doctor may give you some more information about when to call. When to call your doctor (after 20 weeks)  Call 911  anytime you think you may need emergency care. For example, call if:  · You have severe vaginal bleeding. · You have sudden, severe pain in your belly. · You passed out (lost consciousness). · You have a seizure. · You see or feel the umbilical cord. · You think you are about to deliver your baby and can't make it safely to the hospital.  Call your doctor now or seek immediate medical care if:  · You have vaginal bleeding. · You have belly pain. · You have a fever. · You have symptoms of preeclampsia, such as:  ? Sudden swelling of your face, hands, or feet. ? New vision problems (such as dimness, blurring, or seeing spots). ? A severe headache. · You have a sudden release of fluid from your vagina. (You think your water broke.)  · You think that you may be in labor. This means that you've had at least 6 contractions in an hour. · You notice that your baby has stopped moving or is moving much less than normal.  · You have symptoms of a urinary tract infection. These may include:  ? Pain or burning when you urinate.   ? A frequent need to urinate without being able to pass much urine. ? Pain in the flank, which is just below the rib cage and above the waist on either side of the back. ? Blood in your urine. Watch closely for changes in your health, and be sure to contact your doctor if:  · You have vaginal discharge that smells bad. · You have skin changes, such as:  ? A rash. ? Itching. ? Yellow color to your skin. · You have other concerns about your pregnancy. If you have labor signs at 37 weeks or more  If you have signs of labor at 37 weeks or more, your doctor may tell you to call when your labor becomes more active. Symptoms of active labor include:  · Contractions that are regular. · Contractions that are less than 5 minutes apart. · Contractions that are hard to talk through. Follow-up care is a key part of your treatment and safety. Be sure to make and go to all appointments, and call your doctor if you are having problems. It's also a good idea to know your test results and keep a list of the medicines you take. Where can you learn more? Go to https://[employer].SCYNEXIS. org and sign in to your [employer] account. Enter  in the KylesAppSpotr box to learn more about \"Learning About When to Call Your Doctor During Pregnancy (After 20 Weeks). \"     If you do not have an account, please click on the \"Sign Up Now\" link. Current as of: June 16, 2021               Content Version: 13.0  © 1541-2284 [employer]. Care instructions adapted under license by [facility] ([facility]). If you have questions about a medical condition or this instruction, always ask your healthcare professional. [place] [address] any warranty or liability for your use of this information. Patient Education        Counting Your Baby's Kicks: Care Instructions  Overview     Counting your baby's kicks is one way your doctor can tell that your baby is healthy.  Most women--especially in a first pregnancy--feel their baby move for the first time between 16 and 22 weeks. The movement may feel like flutters rather than kicks. Your baby may move more at certain times of the day. When you are active, you may notice less kicking than when you are resting. At your prenatal visits, your doctor will ask whether the baby is active. In your last trimester, your doctor may ask you to count the number of times you feel your baby move. Follow-up care is a key part of your treatment and safety. Be sure to make and go to all appointments, and call your doctor if you are having problems. It's also a good idea to know your test results and keep a list of the medicines you take. How do you count fetal kicks? · A common method of checking your baby's movement is to note the length of time it takes to count ten movements (such as kicks, flutters, or rolls). · Pick your baby's most active time of day to count. This may be any time from morning to evening. · If you don't feel 10 movements in an hour, have something to eat or drink and count for another hour. If you don't feel at least 10 movements in the 2-hour period, call your doctor. When should you call for help? Call your doctor now or seek immediate medical care if:    · You noticed that your baby has stopped moving or is moving much less than normal.   Watch closely for changes in your health, and be sure to contact your doctor if you have any problems. Where can you learn more? Go to https://shoplypeleslieewBox Score Games.boldUnderline. llc. org and sign in to your amazingtunes account. Enter F699 in the [facility] box to learn more about \"Counting Your Baby's Kicks: Care Instructions. \"     If you do not have an account, please click on the \"Sign Up Now\" link. Current as of: June 16, 2021               Content Version: 13.0  © 5263-6472 Healthwise, Incorporated. Care instructions adapted under license by [facility] ([facility]).  If you have questions about a medical condition or this instruction, always ask your healthcare professional. [address] any warranty or liability for your use of this information.

## 2021-12-10 NOTE — PROGRESS NOTES
G1 P 0. 37 wk 2 days presents to the unit after appointment with MFM for elevated blood pressures. Denies LOF, VB or cramping. Reports having a headache 5/10. Onset of headache was yesterday lasting into today. Placed on fetal monitor.

## 2021-12-10 NOTE — PROGRESS NOTES
Pt here for BPP/NST  Pt has [place] Palsy (left side) and just steroid dose pack  Pt also taking valtrex bid   Pt having sporadic contractions/no bleeding or lof  Pt states good fetal movement  Pt also has headache for past week-not sure if related to eye strain due to Bell's Palsy

## 2021-12-10 NOTE — PROGRESS NOTES
controlled with diet. The patient developed Bell's palsy on 2021. The specific cause was not known. She was treated with a Medrol Dosepak and Tamiflu by her family physician. She continues to have right-sided facial drooping, which she states has not progressed since she began taking the medications. A fetal ultrasound evaluation was performed as a detailed report included in the EMR under the imaging tab. A living monzon intrauterine fetus was identified in the cephalic presentation, with normal fetal heart motion and normal fetal motion noted. The amniotic fluid index was 11.4 cm. The biophysical profile cord Doppler studies were both reassuring. There was no evidence of absence, or reversal of end-diastolic flow. The estimated fetal weight on 2021 was 6 pounds 2 ounces which places the fetus at the 59th growth percentile for gestational age.                      GENETIC SCREENING/TERATOLOGY COUNSELING                  (Includes patient, FTB, and any affected family members)    Patient Age > 35 Years NO   Thalassemia ( MVC<80) NO   Congential Heart Defect NO   Neural Tube Defect NO   Barron-Sachs NO   Sickle Cell Disease NO   Sickle Cell Trait NO   Sickle C Disease or Trait NO   Hemophilia NO   Muscular Dystrophy NO   Cystic Fibrosis NO   Chad Disease NO   Autism NO   Mental Retardation NO   History of Fragile X NO   Maternal Diabetes NO   Other Genetic Disease or Syndrome NO   Previous Child With Congenital Abnormality Not Listed NO   Recreational Drugs NO                                        INFECTION HISTORY     HEPATITIS IMMUNIZED  YES   HEPATITIS INFECTION  NO   EXPOSURE TO TB NO   PARVOVIRUS B-19 NO   CHICKEN POX  YES   MEASLES NO   HIV NO   OTHER RASH OR VIRAL ILLNESS SINCE LMP NO   UTI RECURRENT NO   HPV NO     OB History    Para Term  AB Living   1             SAB IAB Ectopic Molar Multiple Live Births                    # Outcome Date GA Lbr Leon/2nd Weight Sex Delivery Anes PTL Lv   1 Current              PAST GYNECOLOGICAL  HISTORY:  Negative for abnormal pap smears. Negative for sexually transmitted diseases. Negative for cervical LEEP / conization /cryosurgery. Negative for uterine surgery. Negative for ovarian or tubal surgery. Past Medical History:   Diagnosis Date    Diet controlled gestational diabetes mellitus (GDM) in third trimester 12/1/2021       Past Surgical History:   Procedure Laterality Date    BREAST SURGERY Bilateral 12/22/2016    exchange bilateral breast silicone implant    CERVICAL POLYP REMOVAL      FOOT SURGERY      planters warts    OTHER SURGICAL HISTORY Bilateral 12/14/15    breast augmentationwith placement silicone implants    TONSILLECTOMY       No Known Allergies    Current Outpatient Medications:     cephALEXin (KEFLEX) 125 MG/5ML suspension, Take by mouth 3 times daily    Prenatal MV-Min-Fe Fum-FA-DHA (PRENATAL 1 PO), Take by mouth    Social History     Tobacco Use    Smoking status: Never Smoker    Smokeless tobacco: Never Used   Substance Use Topics    Alcohol use: Yes     Alcohol/week: 0.0 standard drinks     Comment: social     FAMILY MEDICAL HISTORY:   Negative for congenital abnormalities, autism, genetic disease and mental retardation, not listed above. Review of Systems :   CONSTITUTIONAL : No fever, no chills   HEENT : No headache, no visual changes, no rhinorrhea, no sore throat   CARDIOVASCULAR : No pain, no palpitations, no edema   RESPIRATORY : No pain, no shortness of breath   GASTROINTESTINAL : No N/V, no D/C, no abdominal pain   GENITOURINARY : No dysuria, hematuria and no incontinence   MUSCULOSKELETAL : No myalgia, No back pain  NEUROLOGICAL : No numbness, no tingling, no tremors. No history of seizures  ALL OTHER SYSTEMS WERE REPORTED AS NEGATIVE.     PERTINENT PHYSICAL EXAMINATION:   BP (!) 134/91 (Position: Sitting)   Pulse 125   Wt 165 lb 4 oz (75 kg)   LMP 03/24/2021   BMI 28.37 kg/m² Urine dipstick:   Negative for Glucose    Negative for Albumin    GENERAL:   The patient is a well developed, female who is alert cooperative and oriented times three in no acute distress. HEENT:  Normo cephalic and atraumatic. No facial edema. Right facial drooping. ABDOMEN:   Her uterus is gravid. She had no complaint of abdominal pain or tenderness. The fetal heart rate is 135 bpm. The fetus is in the cephalic presentation which was confirmed by the ultrasound assessment. EXTREMITIES:  No peripheral edema is noted. IMPRESSION:  1.  IUP at 37 weeks 2 days gestation based on her Estimated Date of Delivery: 12/29/21  2. IVF pregnancy  3. GDM (A1)  4. History of placenta previa, resolved  5. Reassuring biophysical profile and cord Doppler testing 12/10/2021  6. Estimated fetal weight was at the 59th growth percentile 12/10/2021  7. Right sided Bell's Palsy started 12/1/2021  8. Elevated blood pressure 12/10/2021    PLAN:  As we discussed at the time of the patient's assessment, I recommended she go to delivery room unit for further evaluation and management secondary to elevated blood pressure in the third trimester of pregnancy with no prior history of hypertension prior to pregnancy. Laboratory testing should be ordered including a CMP, CBC, uric acid, protein creatinine ratio, urinalysis, and urine drug screen. Celestone should be administered for acceleration of fetal organ maturity, however, it is not necessary to wait for completion of the course to induce labor if the diagnosis of gestational hypertension is confirmed. Further evaluation and management will be dependent on the results of patient's testing and her clinical presentation. If her blood pressure remains elevated consistent with a diagnosis of gestational hypertension the patient should be delivered.     The total time in minutes spent with the patient, reviewing medical records, reviewing imaging studies, performing ultrasonic imaging, reviewing laboratory testing, and documenting information was 15 minutes, of which, 50% of the time was spent in patient education, counseling, and coordinating care with the patient, her provider, and/or her family. I personally spoke with Dr. Aury Durant and the staff in the labor and delivery unit regarding the patient's findings and my recommendations for management. Available electronic and paper medical records were reviewed. I discussed with the patient results of her fetal ultrasound assessment performed today including the limitations of the testing. I answered all of her questions to her satisfaction. If you have any questions regarding her management, please contact me at your convenience and thank you for allowing me to participate in her care.     Sincerely,        Merari Cain MD, MS, Nikolai Aparicio, VISHNU, RDMS, RVT  Director [address] [place] [address]  [phone]

## 2021-12-11 ENCOUNTER — HOSPITAL ENCOUNTER (OUTPATIENT)
Age: 31
Discharge: HOME | End: 2021-12-11
Attending: OBSTETRICS & GYNECOLOGY | Admitting: OBSTETRICS & GYNECOLOGY
Payer: COMMERCIAL

## 2021-12-11 PROBLEM — Z3A.37 PREGNANCY WITH 37 WEEKS COMPLETED GESTATION: Status: ACTIVE | Noted: 2021-12-11

## 2021-12-11 PROCEDURE — 6360000002 HC RX W HCPCS: Performed by: OBSTETRICS & GYNECOLOGY

## 2021-12-11 RX ORDER — BETAMETHASONE SODIUM PHOSPHATE AND BETAMETHASONE ACETATE 3; 3 MG/ML; MG/ML
12 INJECTION, SUSPENSION INTRA-ARTICULAR; INTRALESIONAL; INTRAMUSCULAR; SOFT TISSUE ONCE
Status: COMPLETED | OUTPATIENT
Start: 2021-12-11 | End: 2021-12-11

## 2021-12-11 RX ADMIN — BETAMETHASONE SODIUM PHOSPHATE AND BETAMETHASONE ACETATE 12 MG: 3; 3 INJECTION, SUSPENSION INTRA-ARTICULAR; INTRALESIONAL; INTRAMUSCULAR at 13:35

## 2021-12-11 NOTE — PROGRESS NOTES
Patient presents for repeat celestone. States good fetal movement, denies vaginal bleeding or leaking of fluid. Patient states monitoring BP at home. No symptoms of PIH at this time.

## 2021-12-11 NOTE — PROGRESS NOTES
Patient received celestone. Given instructions to call for PIH symptoms. Patient verbalizes understanding.  Ambulating off unit with significant other no further questions

## 2021-12-13 ENCOUNTER — HOSPITAL ENCOUNTER (INPATIENT)
Age: 31
LOS: 3 days | Discharge: HOME | End: 2021-12-16
Attending: OBSTETRICS & GYNECOLOGY | Admitting: OBSTETRICS & GYNECOLOGY
Payer: COMMERCIAL

## 2021-12-13 ENCOUNTER — APPOINTMENT (OUTPATIENT)
Dept: LABOR AND DELIVERY | Age: 31
End: 2021-12-13
Payer: COMMERCIAL

## 2021-12-13 DIAGNOSIS — G89.18 EPISIOTOMY PAIN: Primary | ICD-10-CM

## 2021-12-13 PROBLEM — O09.93 SUPERVISION OF HIGH RISK PREGNANCY IN THIRD TRIMESTER: Status: ACTIVE | Noted: 2021-12-13

## 2021-12-13 PROCEDURE — 1220000001 HC SEMI PRIVATE L&D R&B

## 2021-12-13 PROCEDURE — 0KQM0ZZ REPAIR PERINEUM MUSCLE, OPEN APPROACH: ICD-10-PCS | Performed by: OBSTETRICS & GYNECOLOGY

## 2021-12-13 RX ORDER — SODIUM CHLORIDE, SODIUM LACTATE, POTASSIUM CHLORIDE, CALCIUM CHLORIDE 600; 310; 30; 20 MG/100ML; MG/100ML; MG/100ML; MG/100ML
INJECTION, SOLUTION INTRAVENOUS CONTINUOUS
Status: CANCELLED | OUTPATIENT
Start: 2021-12-13

## 2021-12-13 RX ORDER — SODIUM CHLORIDE 0.9 % (FLUSH) 0.9 %
5-40 SYRINGE (ML) INJECTION EVERY 12 HOURS SCHEDULED
Status: CANCELLED | OUTPATIENT
Start: 2021-12-13

## 2021-12-13 RX ORDER — SODIUM CHLORIDE 9 MG/ML
25 INJECTION, SOLUTION INTRAVENOUS PRN
Status: DISCONTINUED | OUTPATIENT
Start: 2021-12-13 | End: 2021-12-14

## 2021-12-13 RX ORDER — ONDANSETRON 2 MG/ML
4 INJECTION INTRAMUSCULAR; INTRAVENOUS EVERY 6 HOURS PRN
Status: DISCONTINUED | OUTPATIENT
Start: 2021-12-13 | End: 2021-12-14

## 2021-12-13 RX ORDER — SODIUM CHLORIDE, SODIUM LACTATE, POTASSIUM CHLORIDE, CALCIUM CHLORIDE 600; 310; 30; 20 MG/100ML; MG/100ML; MG/100ML; MG/100ML
INJECTION, SOLUTION INTRAVENOUS CONTINUOUS
Status: DISCONTINUED | OUTPATIENT
Start: 2021-12-14 | End: 2021-12-14

## 2021-12-13 RX ORDER — SODIUM CHLORIDE 0.9 % (FLUSH) 0.9 %
5-40 SYRINGE (ML) INJECTION PRN
Status: CANCELLED | OUTPATIENT
Start: 2021-12-13

## 2021-12-13 RX ORDER — SODIUM CHLORIDE 9 MG/ML
25 INJECTION, SOLUTION INTRAVENOUS PRN
Status: CANCELLED | OUTPATIENT
Start: 2021-12-13

## 2021-12-13 RX ORDER — SODIUM CHLORIDE 0.9 % (FLUSH) 0.9 %
5-40 SYRINGE (ML) INJECTION EVERY 12 HOURS SCHEDULED
Status: DISCONTINUED | OUTPATIENT
Start: 2021-12-14 | End: 2021-12-14

## 2021-12-13 RX ORDER — ONDANSETRON 2 MG/ML
4 INJECTION INTRAMUSCULAR; INTRAVENOUS EVERY 6 HOURS PRN
Status: CANCELLED | OUTPATIENT
Start: 2021-12-13

## 2021-12-13 RX ORDER — SODIUM CHLORIDE 0.9 % (FLUSH) 0.9 %
5-40 SYRINGE (ML) INJECTION PRN
Status: DISCONTINUED | OUTPATIENT
Start: 2021-12-13 | End: 2021-12-14

## 2021-12-14 ENCOUNTER — ANESTHESIA EVENT (OUTPATIENT)
Dept: LABOR AND DELIVERY | Age: 31
End: 2021-12-14
Payer: COMMERCIAL

## 2021-12-14 ENCOUNTER — ANESTHESIA (OUTPATIENT)
Dept: LABOR AND DELIVERY | Age: 31
End: 2021-12-14
Payer: COMMERCIAL

## 2021-12-14 LAB
ABO/RH: NORMAL
ALBUMIN SERPL-MCNC: 3.3 G/DL (ref 3.5–5.2)
ALP BLD-CCNC: 222 U/L (ref 35–104)
ALT SERPL-CCNC: 26 U/L (ref 0–32)
AMPHETAMINE SCREEN, URINE: NOT DETECTED
ANION GAP SERPL CALCULATED.3IONS-SCNC: 13 MMOL/L (ref 7–16)
ANTIBODY SCREEN: NORMAL
APTT: 23.7 SEC (ref 24.5–35.1)
AST SERPL-CCNC: 31 U/L (ref 0–31)
BARBITURATE SCREEN URINE: NOT DETECTED
BENZODIAZEPINE SCREEN, URINE: NOT DETECTED
BILIRUB SERPL-MCNC: <0.2 MG/DL (ref 0–1.2)
BUN BLDV-MCNC: 18 MG/DL (ref 6–20)
CALCIUM SERPL-MCNC: 9.1 MG/DL (ref 8.6–10.2)
CANNABINOID SCREEN URINE: NOT DETECTED
CHLORIDE BLD-SCNC: 101 MMOL/L (ref 98–107)
CO2: 21 MMOL/L (ref 22–29)
COCAINE METABOLITE SCREEN URINE: NOT DETECTED
CREAT SERPL-MCNC: 0.8 MG/DL (ref 0.5–1)
FENTANYL SCREEN, URINE: NOT DETECTED
GFR AFRICAN AMERICAN: >60
GFR NON-AFRICAN AMERICAN: >60 ML/MIN/1.73
GLUCOSE BLD-MCNC: 142 MG/DL (ref 74–99)
HCT VFR BLD CALC: 27.3 % (ref 34–48)
HCT VFR BLD CALC: 35.9 % (ref 34–48)
HEMOGLOBIN: 12.1 G/DL (ref 11.5–15.5)
HEMOGLOBIN: 9.3 G/DL (ref 11.5–15.5)
INR BLD: 0.9
Lab: NORMAL
MCH RBC QN AUTO: 31.7 PG (ref 26–35)
MCHC RBC AUTO-ENTMCNC: 33.7 % (ref 32–34.5)
MCV RBC AUTO: 94 FL (ref 80–99.9)
METHADONE SCREEN, URINE: NOT DETECTED
OPIATE SCREEN URINE: NOT DETECTED
OXYCODONE URINE: NOT DETECTED
PDW BLD-RTO: 11.9 FL (ref 11.5–15)
PHENCYCLIDINE SCREEN URINE: NOT DETECTED
PLATELET # BLD: 198 E9/L (ref 130–450)
PMV BLD AUTO: 13.2 FL (ref 7–12)
POTASSIUM REFLEX MAGNESIUM: 3.9 MMOL/L (ref 3.5–5)
PROTHROMBIN TIME: 9.7 SEC (ref 9.3–12.4)
RBC # BLD: 3.82 E12/L (ref 3.5–5.5)
SODIUM BLD-SCNC: 135 MMOL/L (ref 132–146)
TOTAL PROTEIN: 6.5 G/DL (ref 6.4–8.3)
WBC # BLD: 10.1 E9/L (ref 4.5–11.5)

## 2021-12-14 PROCEDURE — 2500000003 HC RX 250 WO HCPCS: Performed by: ANESTHESIOLOGY

## 2021-12-14 PROCEDURE — 86901 BLOOD TYPING SEROLOGIC RH(D): CPT

## 2021-12-14 PROCEDURE — 85014 HEMATOCRIT: CPT

## 2021-12-14 PROCEDURE — 2580000003 HC RX 258: Performed by: OBSTETRICS & GYNECOLOGY

## 2021-12-14 PROCEDURE — 86900 BLOOD TYPING SEROLOGIC ABO: CPT

## 2021-12-14 PROCEDURE — 6360000002 HC RX W HCPCS: Performed by: OBSTETRICS & GYNECOLOGY

## 2021-12-14 PROCEDURE — 85730 THROMBOPLASTIN TIME PARTIAL: CPT

## 2021-12-14 PROCEDURE — 6370000000 HC RX 637 (ALT 250 FOR IP): Performed by: OBSTETRICS & GYNECOLOGY

## 2021-12-14 PROCEDURE — 36415 COLL VENOUS BLD VENIPUNCTURE: CPT

## 2021-12-14 PROCEDURE — 3700000025 EPIDURAL BLOCK: Performed by: ANESTHESIOLOGY

## 2021-12-14 PROCEDURE — 85027 COMPLETE CBC AUTOMATED: CPT

## 2021-12-14 PROCEDURE — 51701 INSERT BLADDER CATHETER: CPT

## 2021-12-14 PROCEDURE — 80307 DRUG TEST PRSMV CHEM ANLYZR: CPT

## 2021-12-14 PROCEDURE — 85610 PROTHROMBIN TIME: CPT

## 2021-12-14 PROCEDURE — 1220000000 HC SEMI PRIVATE OB R&B

## 2021-12-14 PROCEDURE — 86850 RBC ANTIBODY SCREEN: CPT

## 2021-12-14 PROCEDURE — 7200000001 HC VAGINAL DELIVERY

## 2021-12-14 PROCEDURE — 80053 COMPREHEN METABOLIC PANEL: CPT

## 2021-12-14 PROCEDURE — 85018 HEMOGLOBIN: CPT

## 2021-12-14 PROCEDURE — 2500000003 HC RX 250 WO HCPCS

## 2021-12-14 PROCEDURE — 6360000002 HC RX W HCPCS

## 2021-12-14 RX ORDER — FERROUS SULFATE 325(65) MG
325 TABLET ORAL
Status: DISCONTINUED | OUTPATIENT
Start: 2021-12-14 | End: 2021-12-16 | Stop reason: HOSPADM

## 2021-12-14 RX ORDER — SODIUM CHLORIDE 9 MG/ML
25 INJECTION, SOLUTION INTRAVENOUS PRN
Status: DISCONTINUED | OUTPATIENT
Start: 2021-12-14 | End: 2021-12-16 | Stop reason: HOSPADM

## 2021-12-14 RX ORDER — ACETAMINOPHEN 325 MG/1
650 TABLET ORAL EVERY 4 HOURS PRN
Status: DISCONTINUED | OUTPATIENT
Start: 2021-12-14 | End: 2021-12-16 | Stop reason: HOSPADM

## 2021-12-14 RX ORDER — OXYCODONE HYDROCHLORIDE AND ACETAMINOPHEN 5; 325 MG/1; MG/1
1 TABLET ORAL EVERY 4 HOURS PRN
Status: DISCONTINUED | OUTPATIENT
Start: 2021-12-14 | End: 2021-12-16 | Stop reason: HOSPADM

## 2021-12-14 RX ORDER — LIDOCAINE HYDROCHLORIDE 10 MG/ML
INJECTION, SOLUTION INFILTRATION; PERINEURAL
Status: COMPLETED
Start: 2021-12-14 | End: 2021-12-14

## 2021-12-14 RX ORDER — NALOXONE HYDROCHLORIDE 0.4 MG/ML
0.4 INJECTION, SOLUTION INTRAMUSCULAR; INTRAVENOUS; SUBCUTANEOUS PRN
Status: DISCONTINUED | OUTPATIENT
Start: 2021-12-14 | End: 2021-12-14

## 2021-12-14 RX ORDER — VALACYCLOVIR HYDROCHLORIDE 500 MG/1
500 TABLET, FILM COATED ORAL 2 TIMES DAILY
Status: DISCONTINUED | OUTPATIENT
Start: 2021-12-14 | End: 2021-12-16 | Stop reason: HOSPADM

## 2021-12-14 RX ORDER — MODIFIED LANOLIN
OINTMENT (GRAM) TOPICAL PRN
Status: DISCONTINUED | OUTPATIENT
Start: 2021-12-14 | End: 2021-12-16 | Stop reason: HOSPADM

## 2021-12-14 RX ORDER — ACETAMINOPHEN 650 MG
TABLET, EXTENDED RELEASE ORAL
Status: COMPLETED
Start: 2021-12-14 | End: 2021-12-14

## 2021-12-14 RX ORDER — NALBUPHINE HCL 10 MG/ML
5 AMPUL (ML) INJECTION EVERY 4 HOURS PRN
Status: DISCONTINUED | OUTPATIENT
Start: 2021-12-14 | End: 2021-12-14

## 2021-12-14 RX ORDER — SODIUM CHLORIDE 0.9 % (FLUSH) 0.9 %
5-40 SYRINGE (ML) INJECTION PRN
Status: DISCONTINUED | OUTPATIENT
Start: 2021-12-14 | End: 2021-12-16 | Stop reason: HOSPADM

## 2021-12-14 RX ORDER — IBUPROFEN 800 MG/1
800 TABLET ORAL EVERY 8 HOURS
Status: DISCONTINUED | OUTPATIENT
Start: 2021-12-14 | End: 2021-12-16 | Stop reason: HOSPADM

## 2021-12-14 RX ORDER — DOCUSATE SODIUM 100 MG/1
100 CAPSULE, LIQUID FILLED ORAL 2 TIMES DAILY PRN
Status: DISCONTINUED | OUTPATIENT
Start: 2021-12-14 | End: 2021-12-16 | Stop reason: HOSPADM

## 2021-12-14 RX ORDER — ONDANSETRON 2 MG/ML
4 INJECTION INTRAMUSCULAR; INTRAVENOUS EVERY 6 HOURS PRN
Status: DISCONTINUED | OUTPATIENT
Start: 2021-12-14 | End: 2021-12-16 | Stop reason: HOSPADM

## 2021-12-14 RX ORDER — SODIUM CHLORIDE 0.9 % (FLUSH) 0.9 %
5-40 SYRINGE (ML) INJECTION EVERY 12 HOURS SCHEDULED
Status: DISCONTINUED | OUTPATIENT
Start: 2021-12-14 | End: 2021-12-16 | Stop reason: HOSPADM

## 2021-12-14 RX ORDER — SODIUM CHLORIDE, SODIUM LACTATE, POTASSIUM CHLORIDE, CALCIUM CHLORIDE 600; 310; 30; 20 MG/100ML; MG/100ML; MG/100ML; MG/100ML
INJECTION, SOLUTION INTRAVENOUS CONTINUOUS
Status: DISCONTINUED | OUTPATIENT
Start: 2021-12-14 | End: 2021-12-16 | Stop reason: HOSPADM

## 2021-12-14 RX ORDER — ONDANSETRON 2 MG/ML
4 INJECTION INTRAMUSCULAR; INTRAVENOUS EVERY 6 HOURS PRN
Status: DISCONTINUED | OUTPATIENT
Start: 2021-12-14 | End: 2021-12-14

## 2021-12-14 RX ADMIN — Medication 166.7 ML: at 15:31

## 2021-12-14 RX ADMIN — Medication: at 14:14

## 2021-12-14 RX ADMIN — Medication 25 MCG: at 04:43

## 2021-12-14 RX ADMIN — VALACYCLOVIR HYDROCHLORIDE 500 MG: 500 TABLET, FILM COATED ORAL at 20:50

## 2021-12-14 RX ADMIN — SODIUM CHLORIDE, POTASSIUM CHLORIDE, SODIUM LACTATE AND CALCIUM CHLORIDE: 600; 310; 30; 20 INJECTION, SOLUTION INTRAVENOUS at 00:28

## 2021-12-14 RX ADMIN — OXYCODONE AND ACETAMINOPHEN 1 TABLET: 5; 325 TABLET ORAL at 16:11

## 2021-12-14 RX ADMIN — Medication 5 ML: at 07:34

## 2021-12-14 RX ADMIN — BUTORPHANOL TARTRATE 1 MG: 2 INJECTION, SOLUTION INTRAMUSCULAR; INTRAVENOUS at 07:00

## 2021-12-14 RX ADMIN — Medication 15 ML/HR: at 07:35

## 2021-12-14 RX ADMIN — LIDOCAINE HYDROCHLORIDE 200 MG: 10 INJECTION, SOLUTION INFILTRATION; PERINEURAL at 14:14

## 2021-12-14 RX ADMIN — Medication 2000 MG: at 16:11

## 2021-12-14 RX ADMIN — DOCUSATE SODIUM 100 MG: 100 CAPSULE ORAL at 20:46

## 2021-12-14 RX ADMIN — IBUPROFEN 800 MG: 800 TABLET, FILM COATED ORAL at 20:46

## 2021-12-14 RX ADMIN — Medication 25 MCG: at 00:40

## 2021-12-14 RX ADMIN — SODIUM CHLORIDE, POTASSIUM CHLORIDE, SODIUM LACTATE AND CALCIUM CHLORIDE: 600; 310; 30; 20 INJECTION, SOLUTION INTRAVENOUS at 07:51

## 2021-12-14 RX ADMIN — Medication 1 MG: at 07:00

## 2021-12-14 RX ADMIN — Medication 87.3 MILLI-UNITS/MIN: at 20:38

## 2021-12-14 RX ADMIN — SODIUM CHLORIDE, POTASSIUM CHLORIDE, SODIUM LACTATE AND CALCIUM CHLORIDE: 600; 310; 30; 20 INJECTION, SOLUTION INTRAVENOUS at 13:08

## 2021-12-14 RX ADMIN — SODIUM CHLORIDE, POTASSIUM CHLORIDE, SODIUM LACTATE AND CALCIUM CHLORIDE: 600; 310; 30; 20 INJECTION, SOLUTION INTRAVENOUS at 19:10

## 2021-12-14 ASSESSMENT — ENCOUNTER SYMPTOMS: SHORTNESS OF BREATH: 0

## 2021-12-14 ASSESSMENT — PAIN SCALES - GENERAL
PAINLEVEL_OUTOF10: 5
PAINLEVEL_OUTOF10: 6
PAINLEVEL_OUTOF10: 9
PAINLEVEL_OUTOF10: 0

## 2021-12-14 ASSESSMENT — LIFESTYLE VARIABLES: SMOKING_STATUS: 0

## 2021-12-14 ASSESSMENT — PAIN DESCRIPTION - PROGRESSION: CLINICAL_PROGRESSION: GRADUALLY WORSENING

## 2021-12-14 ASSESSMENT — PAIN DESCRIPTION - ONSET: ONSET: GRADUAL

## 2021-12-14 ASSESSMENT — PAIN DESCRIPTION - LOCATION: LOCATION: ABDOMEN;BACK

## 2021-12-14 ASSESSMENT — PAIN DESCRIPTION - PAIN TYPE: TYPE: ACUTE PAIN

## 2021-12-14 ASSESSMENT — PAIN DESCRIPTION - DESCRIPTORS: DESCRIPTORS: ACHING;CRAMPING

## 2021-12-14 ASSESSMENT — PAIN - FUNCTIONAL ASSESSMENT: PAIN_FUNCTIONAL_ASSESSMENT: ACTIVITIES ARE NOT PREVENTED

## 2021-12-14 ASSESSMENT — PAIN DESCRIPTION - ORIENTATION: ORIENTATION: LOWER

## 2021-12-14 ASSESSMENT — PAIN DESCRIPTION - FREQUENCY: FREQUENCY: INTERMITTENT

## 2021-12-14 NOTE — PROGRESS NOTES
Called Dr. Sofy Santoro, updated on SROM at 05:15 for clear fluid, SVE 3cm/80%/-2 station, patient requesting epidural. New orders obtained.

## 2021-12-14 NOTE — PROGRESS NOTES
Ante-Partum Note         S:  Patient comfortable with epidural.  SROM at 0500am.      O: /80   Pulse 71   Temp 98.6 °F (37 °C) (Oral)   Resp 16   Ht 5' 4\" (1.626 m)   Wt 160 lb (72.6 kg)   LMP 2021   SpO2 100%   BMI 27.46 kg/m²               ABD:    Uterus soft, non-tender  FHT's baseline of 130 with mod jonna and accels, ctx's q 2-4'        CX:       AC, vtx @ 0 to +1 station    LABS:     CBC:   Lab Results   Component Value Date    WBC 10.1 2021    RBC 3.82 2021    HGB 12.1 2021    HCT 35.9 2021    MCV 94.0 2021    MCH 31.7 2021    MCHC 33.7 2021    RDW 11.9 2021     2021    MPV 13.2 2021       IMP:  1. IUP @ 37 6/7 weeks           2. Mild preeclampsia           3. Diet controlled GDM             Patient Active Problem List   Diagnosis    Pregnancy resulting from in vitro fertilization in third trimester    Diet controlled gestational diabetes mellitus (GDM) in third trimester    37 weeks gestation of pregnancy    Elevated blood pressure complicating pregnancy in third trimester, antepartum    Elevated blood pressure affecting pregnancy in third trimester, antepartum    Pregnancy with 37 weeks completed gestation    Supervision of high risk pregnancy in third trimester               Plan: 1. Continue spontaneous labor           2.   Anticipate

## 2021-12-14 NOTE — PROGRESS NOTES
, approx 37.6 weeks, presents for scheduled induction, states feeling some ctx, denies lof or vb, states positive fetal movement, placed on efm.

## 2021-12-14 NOTE — H&P
Thor Steven is a 27 y.o. female patient g1, s/p IVF, with developing preeclampsia, s/p Celestone x 2, sent for medical induction per [facility]. Had mild headache and recent Bell's palsy. Some unilateral blurred vision secondary to Bell's palsy. Diet controlled GDM. No diagnosis found. Past Medical History:   Diagnosis Date    Diet controlled gestational diabetes mellitus (GDM) in third trimester 2021    Hypertension      OB History        1    Para        Term                AB        Living           SAB        IAB        Ectopic        Molar        Multiple        Live Births                  37w6d  Estimated Date of Delivery: 21  No Known Allergies  Active Problems:    Supervision of high risk pregnancy in third trimester  Resolved Problems:    * No resolved hospital problems. *    Blood pressure 138/80, pulse 71, temperature 98.6 °F (37 °C), temperature source Oral, resp. rate 16, height 5' 4\" (1.626 m), weight 160 lb (72.6 kg), last menstrual period 2021, SpO2 100 %, not currently breastfeeding.     History  Exam   cv rr  Lungs clear  abd gravid  cx closed on admission    Assessment & Plan  IUP @ 37 6/7 weeks  Mild preeclampsia  Diet controlled GDM  Bell's palsy    Anticipate   Max Howell MD  2021

## 2021-12-14 NOTE — L&D DELIVERY NOTE
Delivery Note    Normal spontaneous vaginal delivery of a 7#3oz. , 3250gm female infant, Apgars 8/9, at [phone], in rightocciput anterior position. Nuchal cord x 1 was present and reduced. Placenta accreta, manually extracted. Endometrium curetted under ultrasound guidance. Thin midline echo present. Second degree episiotomy. Suture used for repair:  Vicryl 3.0. No extensions or lacerations. EBL 1000cc. Mother stable, infant stable to general nursery.

## 2021-12-14 NOTE — ANESTHESIA PROCEDURE NOTES
Epidural Block    Patient location during procedure: patient floor  Start time: 12/14/2021 7:20 AM  End time: 12/14/2021 7:34 AM  Reason for block: labor epidural  Staffing  Performed: resident/CRNA and other anesthesia staff   Anesthesiologist: Cordelia Foster MD  Resident/CRNA: Cherylene Leas, APRN - CRNA  Other anesthesia staff: Mello Albarado RN  Preanesthetic Checklist  Completed: patient identified, IV checked, site marked, risks and benefits discussed, surgical consent, monitors and equipment checked, pre-op evaluation, timeout performed, anesthesia consent given, oxygen available and patient being monitored  Epidural  Patient position: sitting  Prep: ChloraPrep and site prepped and draped  Patient monitoring: cardiac monitor, continuous pulse ox and frequent blood pressure checks  Approach: midline  Location: lumbar (1-5)  Injection technique: CROW air  Guidance: paresthesia technique  Provider prep: mask and sterile gloves  Needle  Needle type: Tuohy   Needle gauge: 18 G  Needle length: 3.5 in  Needle insertion depth: 6 cm  Catheter type: end hole  Catheter size: 20 G.   Catheter at skin depth: 11 cm  Test dose: negative  Assessment  Sensory level: T6  Hemodynamics: stable  Attempts: 1

## 2021-12-14 NOTE — ANESTHESIA PRE PROCEDURE
Department of Anesthesiology  Preprocedure Note       Name:  Ace De La Cruz   Age:  27 y.o.  :  1990                                          MRN:  [id number]         Date:  2021      Surgeon: * No surgeons listed *    Procedure: * No procedures listed *    Medications prior to admission:   Prior to Admission medications    Medication Sig Start Date End Date Taking?  Authorizing Provider   valACYclovir (VALTREX) 1 g tablet Take 500 mg by mouth 2 times daily   Yes Historical Provider, MD   aspirin 81 MG chewable tablet Take 81 mg by mouth daily   Yes Historical Provider, MD   Prenatal MV-Min-Fe Fum-FA-DHA (PRENATAL 1 PO) Take by mouth   Yes Historical Provider, MD       Current medications:    Current Facility-Administered Medications   Medication Dose Route Frequency Provider Last Rate Last Admin    influenza quadrivalent split vaccine (FLUZONE;FLUARIX;FLULAVAL;AFLURIA) injection 0.5 mL  0.5 mL IntraMUSCular Prior to discharge Tung Fernandes MD        naloxone [facility]) injection 0.4 mg  0.4 mg IntraVENous PRN Swapnil Flores MD        nalbuphine (NUBAIN) injection 5 mg  5 mg IntraVENous Q4H PRN Swapnil Flores MD        ondansetron [facility]) injection 4 mg  4 mg IntraVENous Q6H PRN Swapnil Flores MD        fentaNYL 1.85mcg/ml and Bupivicaine 0.1% in 0.9% NS 135ml infusion (OB) epidural  15 mL/hr Epidural Continuous Swapnil Flores MD        fentaNYL 1.85mcg/ml and Bupivicaine 0.1% in 0.9% NS 135ml infusion (OB) epidural             0.9 % sodium chloride infusion  25 mL IntraVENous PRN Tung Fernandes MD        lactated ringers infusion   IntraVENous Continuous Tung Fernandes  mL/hr at 21 0028 New Bag at 21 0028    ondansetron (ZOFRAN) injection 4 mg  4 mg IntraVENous Q6H PRN Tung Fernandes MD        sodium chloride flush 0.9 % injection 5-40 mL  5-40 mL IntraVENous 2 times per day Tung Fernandes MD        sodium chloride flush 0.9 % injection 5-40 mL  5-40 mL IntraVENous PRN Damion Bai MD        miSOPROStol (CYTOTEC) pre-split tablet TABS 25 mcg  25 mcg Vaginal Q4H Damion Bai MD   25 mcg at 12/14/21 0443    oxytocin (PITOCIN) 30 units in 500 mL infusion  1-20 uyen-units/min IntraVENous Continuous Damion Bai MD           Allergies:  No Known Allergies    Problem List:    Patient Active Problem List   Diagnosis Code    Pregnancy resulting from in vitro fertilization in third trimester O09.813    Diet controlled gestational diabetes mellitus (GDM) in third trimester O24.410    40 weeks gestation of pregnancy Z3A.37    Elevated blood pressure complicating pregnancy in third trimester, antepartum O16.3    Elevated blood pressure affecting pregnancy in third trimester, antepartum O16.3    Pregnancy with 37 weeks completed gestation Z3A.37    Supervision of high risk pregnancy in third trimester O09.93       Past Medical History:        Diagnosis Date    Diet controlled gestational diabetes mellitus (GDM) in third trimester 12/1/2021    Hypertension        Past Surgical History:        Procedure Laterality Date    BREAST SURGERY Bilateral 12/22/2016    exchange bilateral breast silicone implant    CERVICAL POLYP REMOVAL      FOOT SURGERY      planters warts    OTHER SURGICAL HISTORY Bilateral 12/14/15    breast augmentationwith placement silicone implants    TONSILLECTOMY         Social History:    Social History     Tobacco Use    Smoking status: Never Smoker    Smokeless tobacco: Never Used   Substance Use Topics    Alcohol use: Not Currently     Alcohol/week: 0.0 standard drinks     Comment: social                                Counseling given: Not Answered      Vital Signs (Current):   Vitals:    12/14/21 0013 12/14/21 0016 12/14/21 0440   BP: 133/81  132/80   Pulse: 90  80   Resp: 12     Temp: 36.6 °C (97.8 °F)     TempSrc: Oral     Weight:  160 lb (72.6 kg)    Height:  5' 4\" (1.626 m) URI and not a current smoker          Patient did not smoke on day of surgery. Cardiovascular:    (+) hypertension ( elevated BP in third trimester ):,                   Neuro/Psych:   (+) neuromuscular disease ( pregnancy related carpal tunnel resolving on its own. Fowler palsy resolving ):,             GI/Hepatic/Renal: Neg GI/Hepatic/Renal ROS       (-) GERD       Endo/Other:    (+) Diabetes ( diet controlled ), . Abdominal:             Vascular: negative vascular ROS. Other Findings: Pregnant           Anesthesia Plan      general, spinal and epidural     ASA 2     (Epidural discussed, also discussed conversion of epidural for  and general anesthesia options )        Anesthetic plan and risks discussed with patient. Use of blood products discussed with patient whom consented to blood products. Plan discussed with CRNA and attending.                   More Stokes RN   2021

## 2021-12-14 NOTE — PROGRESS NOTES
Attempted to get pt up to bathroom she was dizzy when she first stood up and then started to have ringing in her ears. Had pt lay back down pericare done for moderate amount of rubra.

## 2021-12-15 LAB
HCT VFR BLD CALC: 24.6 % (ref 34–48)
HEMOGLOBIN: 8.1 G/DL (ref 11.5–15.5)
MCH RBC QN AUTO: 31.4 PG (ref 26–35)
MCHC RBC AUTO-ENTMCNC: 32.9 % (ref 32–34.5)
MCV RBC AUTO: 95.3 FL (ref 80–99.9)
PDW BLD-RTO: 12.1 FL (ref 11.5–15)
PLATELET # BLD: 178 E9/L (ref 130–450)
PMV BLD AUTO: 13.5 FL (ref 7–12)
RBC # BLD: 2.58 E12/L (ref 3.5–5.5)
WBC # BLD: 25.4 E9/L (ref 4.5–11.5)

## 2021-12-15 PROCEDURE — 6360000002 HC RX W HCPCS: Performed by: OBSTETRICS & GYNECOLOGY

## 2021-12-15 PROCEDURE — 6370000000 HC RX 637 (ALT 250 FOR IP): Performed by: OBSTETRICS & GYNECOLOGY

## 2021-12-15 PROCEDURE — 85027 COMPLETE CBC AUTOMATED: CPT

## 2021-12-15 PROCEDURE — 1220000000 HC SEMI PRIVATE OB R&B

## 2021-12-15 PROCEDURE — 36415 COLL VENOUS BLD VENIPUNCTURE: CPT

## 2021-12-15 RX ADMIN — ACETAMINOPHEN 650 MG: 325 TABLET ORAL at 21:38

## 2021-12-15 RX ADMIN — FERROUS SULFATE TAB 325 MG (65 MG ELEMENTAL FE) 325 MG: 325 (65 FE) TAB at 18:04

## 2021-12-15 RX ADMIN — DOCUSATE SODIUM 100 MG: 100 CAPSULE ORAL at 22:19

## 2021-12-15 RX ADMIN — DOCUSATE SODIUM 100 MG: 100 CAPSULE ORAL at 08:44

## 2021-12-15 RX ADMIN — Medication 2000 MG: at 11:15

## 2021-12-15 RX ADMIN — FERROUS SULFATE TAB 325 MG (65 MG ELEMENTAL FE) 325 MG: 325 (65 FE) TAB at 08:44

## 2021-12-15 RX ADMIN — FERROUS SULFATE TAB 325 MG (65 MG ELEMENTAL FE) 325 MG: 325 (65 FE) TAB at 14:30

## 2021-12-15 RX ADMIN — IBUPROFEN 800 MG: 800 TABLET, FILM COATED ORAL at 18:04

## 2021-12-15 RX ADMIN — VALACYCLOVIR HYDROCHLORIDE 500 MG: 500 TABLET, FILM COATED ORAL at 21:38

## 2021-12-15 RX ADMIN — ACETAMINOPHEN 650 MG: 325 TABLET ORAL at 14:28

## 2021-12-15 RX ADMIN — IBUPROFEN 800 MG: 800 TABLET, FILM COATED ORAL at 08:43

## 2021-12-15 RX ADMIN — ACETAMINOPHEN 650 MG: 325 TABLET ORAL at 00:28

## 2021-12-15 RX ADMIN — Medication 2000 MG: at 21:19

## 2021-12-15 RX ADMIN — VALACYCLOVIR HYDROCHLORIDE 500 MG: 500 TABLET, FILM COATED ORAL at 08:44

## 2021-12-15 ASSESSMENT — PAIN - FUNCTIONAL ASSESSMENT
PAIN_FUNCTIONAL_ASSESSMENT: ACTIVITIES ARE NOT PREVENTED
PAIN_FUNCTIONAL_ASSESSMENT: ACTIVITIES ARE NOT PREVENTED

## 2021-12-15 ASSESSMENT — PAIN DESCRIPTION - PAIN TYPE
TYPE: ACUTE PAIN
TYPE: ACUTE PAIN

## 2021-12-15 ASSESSMENT — PAIN DESCRIPTION - ORIENTATION
ORIENTATION: LOWER;MID
ORIENTATION: LOWER;MID

## 2021-12-15 ASSESSMENT — PAIN SCALES - GENERAL
PAINLEVEL_OUTOF10: 5
PAINLEVEL_OUTOF10: 6
PAINLEVEL_OUTOF10: 3
PAINLEVEL_OUTOF10: 3
PAINLEVEL_OUTOF10: 5

## 2021-12-15 ASSESSMENT — PAIN DESCRIPTION - PROGRESSION
CLINICAL_PROGRESSION: GRADUALLY WORSENING
CLINICAL_PROGRESSION: GRADUALLY WORSENING

## 2021-12-15 ASSESSMENT — PAIN DESCRIPTION - ONSET
ONSET: GRADUAL
ONSET: GRADUAL

## 2021-12-15 ASSESSMENT — PAIN DESCRIPTION - DESCRIPTORS
DESCRIPTORS: ACHING;DISCOMFORT;SORE
DESCRIPTORS: ACHING;DISCOMFORT;SORE

## 2021-12-15 ASSESSMENT — PAIN DESCRIPTION - FREQUENCY
FREQUENCY: INTERMITTENT
FREQUENCY: INTERMITTENT

## 2021-12-15 ASSESSMENT — PAIN DESCRIPTION - LOCATION
LOCATION: ABDOMEN
LOCATION: ABDOMEN;OTHER (COMMENT)

## 2021-12-15 NOTE — LACTATION NOTE
Instructed on normal infant behavior in the first 12-24 hrs, benefits of skin to skin and components of safe positioning, encouraged rooming-in and avoidance of pacifier use until breastfeeding is well established. Reviewed latch techniques, positioning, signs of effective milk transfer, waking techniques and the importance of frequent feedings- 8-12 times/ 24 hrs to stimulate/maintain milk production. Taught hand expression and encouraged to express drops of colostrum at start of feeding. Reviewed feeding cues and expected urine/stool output and transition. Encouraged to feed infant as often and for as long as the infant wishes to do so. Reviewed [place] learning luciana. Offered support and encouraged to call for assistance or concerns.

## 2021-12-15 NOTE — PROGRESS NOTES
Universal  Hearing screening results were discussed with parent. Questions answered. Brochure given to parent. Advised to monitor developmental milestones and contact physician for any concerns.    Jada Maurice, AuD

## 2021-12-15 NOTE — PROGRESS NOTES
Called to the room by patient's  who reported that patient had a pain in her R leg in the calf area. Upon assessment patient stated that pain was subsiding and that the location of that pain had been in both the calf, and the shin. No visible red warm areas that would lead this RN to suspect any type of postpartum complication. Will continue to monitor.

## 2021-12-15 NOTE — PROGRESS NOTES
Post-Partum Note    PPD#1     S:  Patient without complaints. breast feeding. Lochia normal.  Ambulating. Denies headache. O: /87   Pulse 95   Temp 97.9 °F (36.6 °C) (Oral)   Resp 16   Ht 5' 4\" (1.626 m)   Wt 160 lb (72.6 kg)   LMP 03/24/2021   SpO2 98%   Breastfeeding Unknown   BMI 27.46 kg/m²               ABD:    Uterus firm, non-tender. EXT:     No Juan's. LABS:     CBC:   Lab Results   Component Value Date    WBC 25.4 12/15/2021    RBC 2.58 12/15/2021    HGB 8.1 12/15/2021    HCT 24.6 12/15/2021    MCV 95.3 12/15/2021    MCH 31.4 12/15/2021    MCHC 32.9 12/15/2021    RDW 12.1 12/15/2021     12/15/2021    MPV 13.5 12/15/2021       IMP:  1. PPD#1, status-post vaginal delivery with manual extraction of placenta           2. Leukocytosis of unknown etiology  Patient Active Problem List   Diagnosis    Pregnancy resulting from in vitro fertilization in third trimester    Diet controlled gestational diabetes mellitus (GDM) in third trimester    37 weeks gestation of pregnancy    Elevated blood pressure complicating pregnancy in third trimester, antepartum    Elevated blood pressure affecting pregnancy in third trimester, antepartum    Pregnancy with 37 weeks completed gestation    Supervision of high risk pregnancy in third trimester              3.  Acute blood loss anemia    Plan: 1. Routine post-partum care           2. Anceg 2 gm IV q 8 hrs x 2           3. Repeat CBC in am tomorrow           4.   Iron supplementation

## 2021-12-15 NOTE — LACTATION NOTE
Mom reports baby is latching well, sleepy today. Reviewed waking techniques. Encouraged frequent feeds at breast, hand expression and skin to skin to establish supply. Support provided and encouraged to call with any needs.

## 2021-12-15 NOTE — FLOWSHEET NOTE
Admitted to room 310 via W/C, into bed with little assistance needed, denies dizziness. Instructed to call for help when she needs to void. Oriented to room, call light, cell phone, and safety of baby.  here.

## 2021-12-16 VITALS
HEIGHT: 64 IN | DIASTOLIC BLOOD PRESSURE: 69 MMHG | BODY MASS INDEX: 27.31 KG/M2 | HEART RATE: 91 BPM | OXYGEN SATURATION: 96 % | SYSTOLIC BLOOD PRESSURE: 120 MMHG | TEMPERATURE: 97.8 F | RESPIRATION RATE: 16 BRPM | WEIGHT: 160 LBS

## 2021-12-16 LAB
HCT VFR BLD CALC: 23.3 % (ref 34–48)
HEMOGLOBIN: 7.7 G/DL (ref 11.5–15.5)
MCH RBC QN AUTO: 31.8 PG (ref 26–35)
MCHC RBC AUTO-ENTMCNC: 33 % (ref 32–34.5)
MCV RBC AUTO: 96.3 FL (ref 80–99.9)
PDW BLD-RTO: 12.5 FL (ref 11.5–15)
PLATELET # BLD: 199 E9/L (ref 130–450)
PMV BLD AUTO: 13 FL (ref 7–12)
RBC # BLD: 2.42 E12/L (ref 3.5–5.5)
WBC # BLD: 17 E9/L (ref 4.5–11.5)

## 2021-12-16 PROCEDURE — 6370000000 HC RX 637 (ALT 250 FOR IP): Performed by: OBSTETRICS & GYNECOLOGY

## 2021-12-16 PROCEDURE — 6360000002 HC RX W HCPCS: Performed by: OBSTETRICS & GYNECOLOGY

## 2021-12-16 PROCEDURE — 85027 COMPLETE CBC AUTOMATED: CPT

## 2021-12-16 PROCEDURE — 90471 IMMUNIZATION ADMIN: CPT | Performed by: OBSTETRICS & GYNECOLOGY

## 2021-12-16 PROCEDURE — 90715 TDAP VACCINE 7 YRS/> IM: CPT | Performed by: OBSTETRICS & GYNECOLOGY

## 2021-12-16 PROCEDURE — 36415 COLL VENOUS BLD VENIPUNCTURE: CPT

## 2021-12-16 RX ORDER — HYDROCODONE BITARTRATE AND ACETAMINOPHEN 5; 325 MG/1; MG/1
1 TABLET ORAL EVERY 6 HOURS PRN
Qty: 5 TABLET | Refills: 0 | Status: SHIPPED | OUTPATIENT
Start: 2021-12-16 | End: 2021-12-23

## 2021-12-16 RX ORDER — FERROUS SULFATE 325(65) MG
325 TABLET ORAL 2 TIMES DAILY
Qty: 120 TABLET | Refills: 0 | Status: SHIPPED | OUTPATIENT
Start: 2021-12-16 | End: 2022-07-07

## 2021-12-16 RX ORDER — IBUPROFEN 800 MG/1
800 TABLET ORAL EVERY 8 HOURS PRN
Qty: 24 TABLET | Refills: 0 | Status: SHIPPED | OUTPATIENT
Start: 2021-12-16 | End: 2022-07-07

## 2021-12-16 RX ADMIN — FERROUS SULFATE TAB 325 MG (65 MG ELEMENTAL FE) 325 MG: 325 (65 FE) TAB at 08:35

## 2021-12-16 RX ADMIN — DOCUSATE SODIUM 100 MG: 100 CAPSULE ORAL at 08:35

## 2021-12-16 RX ADMIN — TETANUS TOXOID, REDUCED DIPHTHERIA TOXOID AND ACELLULAR PERTUSSIS VACCINE, ADSORBED 0.5 ML: 5; 2.5; 8; 8; 2.5 SUSPENSION INTRAMUSCULAR at 06:39

## 2021-12-16 RX ADMIN — IBUPROFEN 800 MG: 800 TABLET, FILM COATED ORAL at 02:07

## 2021-12-16 RX ADMIN — VALACYCLOVIR HYDROCHLORIDE 500 MG: 500 TABLET, FILM COATED ORAL at 08:36

## 2021-12-16 RX ADMIN — FERROUS SULFATE TAB 325 MG (65 MG ELEMENTAL FE) 325 MG: 325 (65 FE) TAB at 13:17

## 2021-12-16 RX ADMIN — IBUPROFEN 800 MG: 800 TABLET, FILM COATED ORAL at 10:39

## 2021-12-16 RX ADMIN — ACETAMINOPHEN 650 MG: 325 TABLET ORAL at 08:35

## 2021-12-16 ASSESSMENT — PAIN SCALES - GENERAL
PAINLEVEL_OUTOF10: 3
PAINLEVEL_OUTOF10: 5
PAINLEVEL_OUTOF10: 3

## 2021-12-16 NOTE — ANESTHESIA POSTPROCEDURE EVALUATION
Department of Anesthesiology  Postprocedure Note    Patient: Madisyn Gamino  MRN: [id number]  YOB: 1990  Date of evaluation: 12/15/2021  Time:  8:17 PM     Procedure Summary     Date: 12/14/21 Room / Location:     Anesthesia Start: 0720 Anesthesia Stop: 7338    Procedure: Labor Analgesia Diagnosis:     Scheduled Providers:  Responsible Provider: Laura Han MD    Anesthesia Type: general, spinal, epidural ASA Status: 2          Anesthesia Type: general, spinal, epidural    Mike Phase I:      Mike Phase II:      Last vitals: Reviewed and per EMR flowsheets.        Anesthesia Post Evaluation    Patient location during evaluation: bedside  Patient participation: complete - patient participated  Level of consciousness: awake and alert  Pain score: 0  Airway patency: patent  Nausea & Vomiting: no nausea and no vomiting  Complications: no  Cardiovascular status: blood pressure returned to baseline and hemodynamically stable  Respiratory status: room air  Hydration status: euvolemic  Multimodal analgesia pain management approach

## 2021-12-16 NOTE — PROGRESS NOTES
Post-Partum Note    PPD#2     S:  Patient without complaints. Breast feeding. Lochia normal.  Ambulating. O: /84   Pulse 87   Temp 97.9 °F (36.6 °C) (Oral)   Resp 16   Ht 5' 4\" (1.626 m)   Wt 160 lb (72.6 kg)   LMP 03/24/2021   SpO2 97%   Breastfeeding Unknown   BMI 27.46 kg/m²               ABD:    Uterus firm, non-tender. EXT:     No Juan's. LABS:     CBC:   Lab Results   Component Value Date    WBC 17.0 12/16/2021    RBC 2.42 12/16/2021    HGB 7.7 12/16/2021    HCT 23.3 12/16/2021    MCV 96.3 12/16/2021    MCH 31.8 12/16/2021    MCHC 33.0 12/16/2021    RDW 12.5 12/16/2021     12/16/2021    MPV 13.0 12/16/2021       IMP:  1. PPD#2, status-post vaginal delivery            2.  Normalizing leukocytosis, thought to be secondary to marginalization due to labor  Patient Active Problem List   Diagnosis    Pregnancy resulting from in vitro fertilization in third trimester    Diet controlled gestational diabetes mellitus (GDM) in third trimester    37 weeks gestation of pregnancy    Elevated blood pressure complicating pregnancy in third trimester, antepartum    Elevated blood pressure affecting pregnancy in third trimester, antepartum    Pregnancy with 37 weeks completed gestation    Supervision of high risk pregnancy in third trimester              3.  Stabla acute blood loss anemia    Plan: 1. Routine post-partum care           2. Discharge to home           3. Follow-up in office as directed           4.   Iron supplementation

## 2021-12-16 NOTE — LACTATION NOTE
Baby weight loss is at 12.77%. Tcb is 11.5. Spoke to mom about these concerns. Mom is open to supplementing with infant formula. Drops of colostrum present upon hand expression. Assisted with positioning and latch. Baby is continually on and off with latch and then appears drowsy. Discussed using the Supplemental nursing system (SNS). Set up the system and baby took in 10 ml of formula and fell asleep. Will set up electric breast pump to stimulate milk production. Encouraged mom to use the SNS with every breast feed.

## 2021-12-16 NOTE — DISCHARGE SUMMARY
Obstetric Discharge Summary    Patient Name:  Jimmy Bailon    Medical Record Number:  [id number]    Attending:  Gwen Carter MD    Date of Admission:  2021    Date of Discharge:        Admitting Diagnosis     OB History        1    Para   1    Term   1            AB        Living   1       SAB        IAB        Ectopic        Molar        Multiple   0    Live Births   1                Reasons for Admission on 2021 11:48 PM  Supervision of high risk pregnancy in third trimester [O09.93]  37 weeks gestation of pregnancy [Z3A.37]  Mild preeclampsia  S/p IVF  GDM diet controlled    Intrapartum Procedures  , episiotomy with repair       Postpartum/Operative Complications    Post partum hemorrhage  [place] Data  Information for the patient's :  Briana Dill, Simone [facility] [[id number]]   female   Birth Weight: 7 lb 2.6 oz (3.25 kg)     Discharge With Mother  Complications: No    Discharge Condition:  Stable       Discharge Labs:  H/H 7.7    Discharge Meds:       Medication List      START taking these medications    ferrous sulfate 325 (65 Fe) MG tablet  Commonly known as: IRON 325  Take 1 tablet by mouth 2 times daily     HYDROcodone-acetaminophen 5-325 MG per tablet  Commonly known as: Norco  Take 1 tablet by mouth every 6 hours as needed for Pain for up to 7 days. Intended supply: 3 days.  Take lowest dose possible to manage pain     ibuprofen 800 MG tablet  Commonly known as: ADVIL;MOTRIN  Take 1 tablet by mouth every 8 hours as needed for Pain (cramping)        CONTINUE taking these medications    PRENATAL 1 PO     valACYclovir 1 g tablet  Commonly known as: VALTREX        STOP taking these medications    aspirin 81 MG chewable tablet           Where to Get Your Medications      You can get these medications from any pharmacy    Bring a paper prescription for each of these medications  · ferrous sulfate 325 (65 Fe) MG tablet  · HYDROcodone-acetaminophen 5-325 MG per tablet  · ibuprofen 800 MG tablet         Discharge Information  Current Discharge Medication List      START taking these medications    Details   ferrous sulfate (IRON 325) 325 (65 Fe) MG tablet Take 1 tablet by mouth 2 times daily  Qty: 120 tablet, Refills: 0      ibuprofen (ADVIL;MOTRIN) 800 MG tablet Take 1 tablet by mouth every 8 hours as needed for Pain (cramping)  Qty: 24 tablet, Refills: 0      HYDROcodone-acetaminophen (NORCO) 5-325 MG per tablet Take 1 tablet by mouth every 6 hours as needed for Pain for up to 7 days. Intended supply: 3 days. Take lowest dose possible to manage pain  Qty: 5 tablet, Refills: 0    Comments: Reduce doses taken as pain becomes manageable  Associated Diagnoses: Episiotomy pain         CONTINUE these medications which have NOT CHANGED    Details   valACYclovir (VALTREX) 1 g tablet Take 500 mg by mouth 2 times daily      Prenatal MV-Min-Fe Fum-FA-DHA (PRENATAL 1 PO) Take by mouth         STOP taking these medications       aspirin 81 MG chewable tablet Comments:   Reason for Stopping:               No discharge procedures on file.     Discharge to: Home  Follow up in 6-8 weeks      Comments

## 2022-09-20 NOTE — PROGRESS NOTES
[facility] PRE-ADMISSION TESTING INSTRUCTIONS    The Preadmission Testing patient is instructed accordingly using the following criteria (check applicable):    ARRIVAL INSTRUCTIONS:  [x] Parking the day of Surgery is located in the Main Entrance lot. Upon entering the door, make an immediate right to the surgery reception desk    [x] Bring photo ID and insurance card    [] Bring in a copy of Living will or Durable Power of  papers. [x] Please be sure to arrange for responsible adult to provide transportation to and from the hospital    [x] Please arrange for responsible adult to be with you for the 24 hour period post procedure due to having anesthesia    [x] If you awake am of surgery not feeling well or have temperature >100 please call [phone]    GENERAL INSTRUCTIONS:    [x] Nothing by mouth after midnight, including gum, candy, mints or water    [x] You may brush your teeth, but do not swallow any water    [] Take medications as instructed with 1-2 oz of water    [x] Stop herbal supplements and vitamins 5 days prior to procedure    [] Follow preop dosing of blood thinners per physician instructions    [] Take 1/2 dose of evening insulin, but no insulin after midnight    [] No oral diabetic medications after midnight    [] If diabetic and have low blood sugar or feel symptomatic, take 1-2oz apple juice only    [] Bring inhalers day of surgery    [] Bring C-PAP/ Bi-Pap day of surgery    [x] Bring urine specimen day of surgery    [x] Shower or bath with soap, lather and rinse well, AM of Surgery, no lotion, powders or creams to surgical site    [] Follow bowel prep as instructed per surgeon    [x] No tobacco products within 24 hours of surgery     [x] No alcohol or illegal drug use within 24 hours of surgery.     [x] Jewelry, body piercing's, eyeglasses, contact lenses and dentures are not permitted into surgery (bring cases)      [x] Please do not wear any nail polish, make up or hair products on the day of surgery    [x] You can expect a call the business day prior to procedure to notify you if your arrival time changes    [x] If you receive a survey after surgery we would greatly appreciate your comments    [] Parent/guardian of a minor must accompany their child and remain on the premises  the entire time they are under our care     [] Pediatric patients may bring favorite toy, blanket or comfort item with them    [] A caregiver or family member must remain with the patient during their stay if they are mentally handicapped, have dementia, disoriented or unable to use a call light or would be a safety concern if left unattended    [x] Please notify surgeon if you develop any illness between now and time of surgery (cold, cough, sore throat, fever, nausea, vomiting) or any signs of infections  including skin, wounds, and dental.    [x]  The Outpatient Pharmacy is available to fill your prescription here on your day of surgery, ask your preop nurse for details    [] Other instructions    EDUCATIONAL MATERIALS PROVIDED:    [] PAT Preoperative Education Packet/Booklet     [] Medication List    [] Transfusion bracelet applied with instructions    [] Shower with soap, lather and rinse well, and use CHG wipes provided the evening before surgery as instructed    [] Incentive spirometer with instructions

## 2022-09-24 NOTE — H&P
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 Has had some irregular bleeding. Sonogram reveals 0.66x0.37cm hyperechoic area in mid fundus c/w retained placental fragment. HCG neg. Denied treatment with Methotrexate. Past Medical History:        Diagnosis Date    COVID-19 2022    Diet controlled gestational diabetes mellitus (GDM) in third trimester 2021    Retained portions of placenta     For OR 22    Uterine polyp      Past Surgical History:        Procedure Laterality Date    BREAST SURGERY Bilateral 2016    exchange bilateral breast silicone implant    CERVICAL POLYP REMOVAL      FOOT SURGERY      planters warts    OTHER SURGICAL HISTORY Bilateral 12/14/15    breast augmentationwith placement silicone implants    TONSILLECTOMY       Allergies:  Patient has no known allergies. Social History:    Social History     Socioeconomic History    Marital status:      Spouse name: Not on file    Number of children: Not on file    Years of education: Not on file    Highest education level: Not on file   Occupational History    Not on file   Tobacco Use    Smoking status: Never    Smokeless tobacco: Never   Vaping Use    Vaping Use: Never used   Substance and Sexual Activity    Alcohol use:  Yes     Alcohol/week: 0.0 standard drinks     Comment: social    Drug use: No    Sexual activity: Not on file   Other Topics Concern    Not on file   Social History Narrative    Not on file     Social Determinants of Health     Financial Resource Strain: Not on file   Food Insecurity: Not on file   Transportation Needs: Not on file   Physical Activity: Not on file   Stress: Not on file   Social Connections: Not on file   Intimate Partner Violence: Not on file   Housing Stability: Not on file     Family History:

## 2022-09-24 NOTE — OP NOTE
Operative Note      Patient: Melonie Loera  YOB: 1990  MRN: [id number]    Date of Procedure: 9/26/2022    Pre-Op Diagnosis: Retained placenta [O73.0]                                  Hx placenta accreta    Post-Op Diagnosis: Same       Procedure(s):  DILATATION AND CURETTAGE HYSTEROSCOPY    Surgeon(s):   Max Howell MD    Anesthesia: General    Estimated Blood Loss (mL): Minimal    Complications: None    Specimens:   Endometrial biopsies and curettings      Drains: None    Findings: Small area of thickened tissue on left anterior fundal wall    Condition:  Stable    Detailed Description of Procedure:   [id number]    Electronically signed by Max Howell MD on 9/24/2022 at 6:15 PM

## 2022-09-25 ENCOUNTER — ANESTHESIA EVENT (OUTPATIENT)
Dept: OPERATING ROOM | Age: 32
End: 2022-09-25
Payer: COMMERCIAL

## 2022-09-25 LAB
HCG, URINE, POC: NEGATIVE
Lab: NORMAL
NEGATIVE QC PASS/FAIL: NORMAL
POSITIVE QC PASS/FAIL: NORMAL

## 2022-09-26 ENCOUNTER — HOSPITAL ENCOUNTER (OUTPATIENT)
Age: 32
Setting detail: OUTPATIENT SURGERY
Discharge: HOME | End: 2022-09-26
Attending: OBSTETRICS & GYNECOLOGY | Admitting: OBSTETRICS & GYNECOLOGY
Payer: COMMERCIAL

## 2022-09-26 ENCOUNTER — ANESTHESIA (OUTPATIENT)
Dept: OPERATING ROOM | Age: 32
End: 2022-09-26
Payer: COMMERCIAL

## 2022-09-26 VITALS
OXYGEN SATURATION: 95 % | WEIGHT: 135 LBS | HEART RATE: 67 BPM | RESPIRATION RATE: 18 BRPM | TEMPERATURE: 97.5 F | SYSTOLIC BLOOD PRESSURE: 110 MMHG | HEIGHT: 64 IN | DIASTOLIC BLOOD PRESSURE: 78 MMHG | BODY MASS INDEX: 23.05 KG/M2

## 2022-09-26 LAB
HCT VFR BLD CALC: 42.8 % (ref 34–48)
HEMOGLOBIN: 14.1 G/DL (ref 11.5–15.5)

## 2022-09-26 PROCEDURE — 2580000003 HC RX 258: Performed by: NURSE ANESTHETIST, CERTIFIED REGISTERED

## 2022-09-26 PROCEDURE — 7100000001 HC PACU RECOVERY - ADDTL 15 MIN: Performed by: OBSTETRICS & GYNECOLOGY

## 2022-09-26 PROCEDURE — 88305 TISSUE EXAM BY PATHOLOGIST: CPT

## 2022-09-26 PROCEDURE — 3600000013 HC SURGERY LEVEL 3 ADDTL 15MIN: Performed by: OBSTETRICS & GYNECOLOGY

## 2022-09-26 PROCEDURE — 3700000001 HC ADD 15 MINUTES (ANESTHESIA): Performed by: OBSTETRICS & GYNECOLOGY

## 2022-09-26 PROCEDURE — 3600000003 HC SURGERY LEVEL 3 BASE: Performed by: OBSTETRICS & GYNECOLOGY

## 2022-09-26 PROCEDURE — 2709999900 HC NON-CHARGEABLE SUPPLY: Performed by: OBSTETRICS & GYNECOLOGY

## 2022-09-26 PROCEDURE — 7100000011 HC PHASE II RECOVERY - ADDTL 15 MIN: Performed by: OBSTETRICS & GYNECOLOGY

## 2022-09-26 PROCEDURE — 85014 HEMATOCRIT: CPT

## 2022-09-26 PROCEDURE — 6360000002 HC RX W HCPCS: Performed by: OBSTETRICS & GYNECOLOGY

## 2022-09-26 PROCEDURE — 6360000002 HC RX W HCPCS: Performed by: NURSE ANESTHETIST, CERTIFIED REGISTERED

## 2022-09-26 PROCEDURE — 7100000000 HC PACU RECOVERY - FIRST 15 MIN: Performed by: OBSTETRICS & GYNECOLOGY

## 2022-09-26 PROCEDURE — 7100000010 HC PHASE II RECOVERY - FIRST 15 MIN: Performed by: OBSTETRICS & GYNECOLOGY

## 2022-09-26 PROCEDURE — 3700000000 HC ANESTHESIA ATTENDED CARE: Performed by: OBSTETRICS & GYNECOLOGY

## 2022-09-26 PROCEDURE — 2500000003 HC RX 250 WO HCPCS: Performed by: NURSE ANESTHETIST, CERTIFIED REGISTERED

## 2022-09-26 PROCEDURE — 36415 COLL VENOUS BLD VENIPUNCTURE: CPT

## 2022-09-26 PROCEDURE — 85018 HEMOGLOBIN: CPT

## 2022-09-26 PROCEDURE — 2580000003 HC RX 258: Performed by: OBSTETRICS & GYNECOLOGY

## 2022-09-26 RX ORDER — SODIUM CHLORIDE 9 MG/ML
INJECTION, SOLUTION INTRAVENOUS PRN
Status: CANCELLED | OUTPATIENT
Start: 2022-09-26

## 2022-09-26 RX ORDER — LIDOCAINE HYDROCHLORIDE 20 MG/ML
INJECTION, SOLUTION EPIDURAL; INFILTRATION; INTRACAUDAL; PERINEURAL PRN
Status: DISCONTINUED | OUTPATIENT
Start: 2022-09-26 | End: 2022-09-26 | Stop reason: SDUPTHER

## 2022-09-26 RX ORDER — PROPOFOL 10 MG/ML
INJECTION, EMULSION INTRAVENOUS PRN
Status: DISCONTINUED | OUTPATIENT
Start: 2022-09-26 | End: 2022-09-26 | Stop reason: SDUPTHER

## 2022-09-26 RX ORDER — DEXAMETHASONE SODIUM PHOSPHATE 4 MG/ML
INJECTION, SOLUTION INTRA-ARTICULAR; INTRALESIONAL; INTRAMUSCULAR; INTRAVENOUS; SOFT TISSUE PRN
Status: DISCONTINUED | OUTPATIENT
Start: 2022-09-26 | End: 2022-09-26 | Stop reason: SDUPTHER

## 2022-09-26 RX ORDER — ONDANSETRON 2 MG/ML
INJECTION INTRAMUSCULAR; INTRAVENOUS PRN
Status: DISCONTINUED | OUTPATIENT
Start: 2022-09-26 | End: 2022-09-26 | Stop reason: SDUPTHER

## 2022-09-26 RX ORDER — SODIUM CHLORIDE, SODIUM LACTATE, POTASSIUM CHLORIDE, CALCIUM CHLORIDE 600; 310; 30; 20 MG/100ML; MG/100ML; MG/100ML; MG/100ML
INJECTION, SOLUTION INTRAVENOUS CONTINUOUS
Status: DISCONTINUED | OUTPATIENT
Start: 2022-09-26 | End: 2022-09-26 | Stop reason: HOSPADM

## 2022-09-26 RX ORDER — FENTANYL CITRATE 50 UG/ML
25 INJECTION, SOLUTION INTRAMUSCULAR; INTRAVENOUS EVERY 5 MIN PRN
Status: CANCELLED | OUTPATIENT
Start: 2022-09-26

## 2022-09-26 RX ORDER — MIDAZOLAM HYDROCHLORIDE 1 MG/ML
INJECTION INTRAMUSCULAR; INTRAVENOUS PRN
Status: DISCONTINUED | OUTPATIENT
Start: 2022-09-26 | End: 2022-09-26 | Stop reason: SDUPTHER

## 2022-09-26 RX ORDER — IBUPROFEN 800 MG/1
800 TABLET ORAL EVERY 8 HOURS PRN
Qty: 21 TABLET | Refills: 0 | Status: SHIPPED | OUTPATIENT
Start: 2022-09-26

## 2022-09-26 RX ORDER — MORPHINE SULFATE 2 MG/ML
2 INJECTION, SOLUTION INTRAMUSCULAR; INTRAVENOUS EVERY 5 MIN PRN
Status: CANCELLED | OUTPATIENT
Start: 2022-09-26

## 2022-09-26 RX ORDER — ONDANSETRON 2 MG/ML
4 INJECTION INTRAMUSCULAR; INTRAVENOUS
Status: CANCELLED | OUTPATIENT
Start: 2022-09-26 | End: 2022-09-27

## 2022-09-26 RX ORDER — SODIUM CHLORIDE 9 MG/ML
INJECTION, SOLUTION INTRAVENOUS CONTINUOUS PRN
Status: DISCONTINUED | OUTPATIENT
Start: 2022-09-26 | End: 2022-09-26 | Stop reason: SDUPTHER

## 2022-09-26 RX ORDER — SODIUM CHLORIDE 0.9 % (FLUSH) 0.9 %
5-40 SYRINGE (ML) INJECTION PRN
Status: CANCELLED | OUTPATIENT
Start: 2022-09-26

## 2022-09-26 RX ORDER — FENTANYL CITRATE 50 UG/ML
INJECTION, SOLUTION INTRAMUSCULAR; INTRAVENOUS PRN
Status: DISCONTINUED | OUTPATIENT
Start: 2022-09-26 | End: 2022-09-26 | Stop reason: SDUPTHER

## 2022-09-26 RX ORDER — KETOROLAC TROMETHAMINE 30 MG/ML
INJECTION, SOLUTION INTRAMUSCULAR; INTRAVENOUS PRN
Status: DISCONTINUED | OUTPATIENT
Start: 2022-09-26 | End: 2022-09-26 | Stop reason: SDUPTHER

## 2022-09-26 RX ORDER — SODIUM CHLORIDE 0.9 % (FLUSH) 0.9 %
5-40 SYRINGE (ML) INJECTION EVERY 12 HOURS SCHEDULED
Status: CANCELLED | OUTPATIENT
Start: 2022-09-26

## 2022-09-26 RX ADMIN — SODIUM CHLORIDE: 9 INJECTION, SOLUTION INTRAVENOUS at 09:03

## 2022-09-26 RX ADMIN — FENTANYL CITRATE 50 MCG: 50 INJECTION, SOLUTION INTRAMUSCULAR; INTRAVENOUS at 09:19

## 2022-09-26 RX ADMIN — LIDOCAINE HYDROCHLORIDE 60 MG: 20 INJECTION, SOLUTION EPIDURAL; INFILTRATION; INTRACAUDAL; PERINEURAL at 09:07

## 2022-09-26 RX ADMIN — ONDANSETRON 4 MG: 2 INJECTION INTRAMUSCULAR; INTRAVENOUS at 09:29

## 2022-09-26 RX ADMIN — DEXAMETHASONE SODIUM PHOSPHATE 10 MG: 4 INJECTION, SOLUTION INTRAMUSCULAR; INTRAVENOUS at 09:18

## 2022-09-26 RX ADMIN — MIDAZOLAM 2 MG: 1 INJECTION INTRAMUSCULAR; INTRAVENOUS at 09:03

## 2022-09-26 RX ADMIN — KETOROLAC TROMETHAMINE 30 MG: 30 INJECTION, SOLUTION INTRAMUSCULAR; INTRAVENOUS at 09:32

## 2022-09-26 RX ADMIN — WATER 2000 MG: 1 INJECTION INTRAMUSCULAR; INTRAVENOUS; SUBCUTANEOUS at 09:13

## 2022-09-26 RX ADMIN — PROPOFOL 180 MG: 10 INJECTION, EMULSION INTRAVENOUS at 09:07

## 2022-09-26 RX ADMIN — FENTANYL CITRATE 50 MCG: 50 INJECTION, SOLUTION INTRAMUSCULAR; INTRAVENOUS at 09:07

## 2022-09-26 ASSESSMENT — PAIN SCALES - GENERAL
PAINLEVEL_OUTOF10: 1
PAINLEVEL_OUTOF10: 1

## 2022-09-26 ASSESSMENT — PAIN - FUNCTIONAL ASSESSMENT: PAIN_FUNCTIONAL_ASSESSMENT: 0-10

## 2022-09-26 ASSESSMENT — PAIN DESCRIPTION - DESCRIPTORS
DESCRIPTORS: CRAMPING
DESCRIPTORS: CRAMPING

## 2022-09-26 ASSESSMENT — PAIN DESCRIPTION - LOCATION: LOCATION: ABDOMEN;PELVIS

## 2022-09-26 NOTE — DISCHARGE INSTRUCTIONS
Discharge Instructions  General  No driving for the remainder of today  No douching, intercourse, tampons, pools or spas until bleeding stops  May take shower or tub bath   May advance all other activity as tolerated    Follow-up   Expect some spotting to light vaginal bleeding  May take Motrin or use heating pad or warm bath for cramping  I will call with pathology when read  Call for fever, chills, increased bleeding, worsening abdominal pain, or any other concerns

## 2022-09-26 NOTE — ANESTHESIA PRE PROCEDURE
 BREAST SURGERY Bilateral 12/22/2016    exchange bilateral breast silicone implant    CERVICAL POLYP REMOVAL      FOOT SURGERY      planters warts    OTHER SURGICAL HISTORY Bilateral 12/14/15    breast augmentationwith placement silicone implants    TONSILLECTOMY         Social History:    Social History     Tobacco Use    Smoking status: Never    Smokeless tobacco: Never   Substance Use Topics    Alcohol use: Yes     Alcohol/week: 0.0 standard drinks     Comment: social                                Counseling given: Not Answered      Vital Signs (Current):   Vitals:    09/20/22 1447 09/26/22 0714   BP:  128/78   Pulse:  72   Resp:  20   Temp:  (!) 35.7 °C (96.3 °F)   TempSrc:  Temporal   SpO2:  95%   Weight: 135 lb (61.2 kg) 135 lb (61.2 kg)   Height: 5' 4\" (1.626 m) 5' 4\" (1.626 m)                                              BP Readings from Last 3 Encounters:   09/26/22 128/78   12/16/21 120/69   12/10/21 (!) 137/90       NPO Status: Time of last liquid consumption: 1900                        Time of last solid consumption: 1900                        Date of last liquid consumption: 09/25/22                        Date of last solid food consumption: 09/25/22    BMI:   Wt Readings from Last 3 Encounters:   09/26/22 135 lb (61.2 kg)   12/14/21 160 lb (72.6 kg)   12/10/21 165 lb (74.8 kg)     Body mass index is 23.17 kg/m².     CBC:   Lab Results   Component Value Date/Time    WBC 17.0 12/16/2021 05:41 AM    RBC 2.42 12/16/2021 05:41 AM    HGB 14.1 09/26/2022 07:12 AM    HCT 42.8 09/26/2022 07:12 AM    MCV 96.3 12/16/2021 05:41 AM    RDW 12.5 12/16/2021 05:41 AM     12/16/2021 05:41 AM       CMP:   Lab Results   Component Value Date/Time     12/14/2021 12:20 AM    K 3.9 12/14/2021 12:20 AM     12/14/2021 12:20 AM    CO2 21 12/14/2021 12:20 AM    BUN 18 12/14/2021 12:20 AM    CREATININE 0.8 12/14/2021 12:20 AM    GFRAA >60 12/14/2021 12:20 AM    LABGLOM >60 12/14/2021 12:20 AM GLUCOSE 142 12/14/2021 12:20 AM    PROT 6.5 12/14/2021 12:20 AM    CALCIUM 9.1 12/14/2021 12:20 AM    BILITOT <0.2 12/14/2021 12:20 AM    ALKPHOS 222 12/14/2021 12:20 AM    AST 31 12/14/2021 12:20 AM    ALT 26 12/14/2021 12:20 AM       POC Tests: No results for input(s): POCGLU, POCNA, POCK, POCCL, POCBUN, POCHEMO, POCHCT in the last 72 hours. Coags:   Lab Results   Component Value Date/Time    PROTIME 9.7 12/14/2021 12:20 AM    INR 0.9 12/14/2021 12:20 AM    APTT 23.7 12/14/2021 12:20 AM       HCG (If Applicable):   Lab Results   Component Value Date    PREGTESTUR negative 12/22/2016        ABGs: No results found for: PHART, PO2ART, DKX3RQD, FEI6AWP, BEART, B7PTDAAQ     Type & Screen (If Applicable):  No results found for: LABABO, LABRH    Drug/Infectious Status (If Applicable):  No results found for: HIV, HEPCAB    COVID-19 Screening (If Applicable): No results found for: COVID19        Anesthesia Evaluation  Patient summary reviewed and Nursing notes reviewed no history of anesthetic complications:   Airway: Mallampati: II  TM distance: >3 FB   Neck ROM: full  Mouth opening: > = 3 FB   Dental:      Comment: No teeth loose/missing per pt    Pulmonary: breath sounds clear to auscultation                             Cardiovascular:  Exercise tolerance: good (>4 METS),           Rhythm: regular  Rate: normal                    Neuro/Psych:               GI/Hepatic/Renal:             Endo/Other:    (+) Diabetes (diet controlled gestational, in third trimester.), .                  ROS comment: Gestational DM. Not diabetic now. Abdominal:       Abdomen: soft. Vascular: Other Findings:           Anesthesia Plan      general     ASA 2           MIPS: Postoperative opioids intended. Anesthetic plan and risks discussed with patient. Use of blood products discussed with patient whom. Plan discussed with CRNA and attending. Pt. Assessed, chart reviewed, agree to proceed.   Yoly Grimes CHANI Rowley RN   9/26/2022    Patient seen and examined, chart reviewed, agree with above findings. Anesthetic plan, risks, benefits, alternatives, and personnel involved discussed with patient. Patient verbalized an understanding and agreed to proceed. NPO status confirmed. Anesthetic plan discussed with care team members and agreed upon.     Dom Sy DO   9/26/2022  9:02 AM

## 2022-09-26 NOTE — PROGRESS NOTES
Went over discharge instructions with patient. Patient verbalized understanding of instructions. Called her uncle for a ride.

## 2022-09-26 NOTE — ANESTHESIA POSTPROCEDURE EVALUATION
Department of Anesthesiology  Postprocedure Note    Patient: Jaswant Mcconnell  MRN: [id number]  YOB: 1990  Date of evaluation: 9/26/2022      Procedure Summary     Date: 09/26/22 Room / Location: [facility] OR 10 / [facility]    Anesthesia Start: 7956 Anesthesia Stop: 1853    Procedure: DILATATION AND CURETTAGE HYSTEROSCOPY (Vagina ) Diagnosis:       Retained placenta, unspecified portion of placenta      (Retained placenta, unspecified portion of placenta [O73.0])    Surgeons: Pao Trivedi MD Responsible Provider: Yesenia Mireles DO    Anesthesia Type: general ASA Status: 2          Anesthesia Type: No value filed.     Mike Phase I: Mike Score: 10    Mike Phase II:        Anesthesia Post Evaluation    Patient location during evaluation: PACU  Patient participation: complete - patient participated  Level of consciousness: awake  Airway patency: patent  Nausea & Vomiting: no nausea and no vomiting  Complications: no  Cardiovascular status: blood pressure returned to baseline and hemodynamically stable  Respiratory status: acceptable and face mask  Hydration status: euvolemic  Multimodal analgesia pain management approach

## 2022-09-27 NOTE — OP NOTE
[id number] [address]                                OPERATIVE REPORT    PATIENT NAME: James Dias            :        1990  MED REC NO:   [id number]                            ROOM:  ACCOUNT NO:   [de-identified]                           ADMIT DATE: 2022  PROVIDER:     Bertin Spear MD    DATE OF PROCEDURE:  2022    PREOPERATIVE DIAGNOSES:  1. Retained placenta. 2.  History of placenta accreta. POSTOPERATIVE DIAGNOSES:  1. Retained placenta. 2.  History of placenta accreta. PROCEDURES PERFORMED:  Dilatation and curettage and hysteroscopy. SURGEON:  Bertin Spear MD.    ANESTHESIA:  General.    FLUIDS:  IV crystalloid. COMPLICATIONS:  None. ESTIMATED BLOOD LOSS:  Minimal _____. SPECIMEN:  Endometrial biopsies and curettings. DRAINS:  None. FINDINGS:  A small area of thickened tissue on left anterior fundal  wall. CONDITION:  Stable. DESCRIPTION OF PROCEDURE:  The patient was brought to the ambulatory  operating suite and prepped and draped in the usual fashion in dorsal  lithotomy position. A weighted speculum was placed posteriorly, an  angled retractor anteriorly, and the cervix was grasped with a small  ring forceps. The 2.9-mm video hysteroscope was then assembled and  inserted. Visualization of the cavity revealed small area, less than 1  cm2 on the anterior left fundal wall of the uterus, which was biopsied  initially with the hysteroscopic biopsy forceps and then the endometrial  cavity was curetted. Revisualization revealed removal of the tissue. All sponge, lap, and instrument counts were correct and bleeding was  minimal.  The patient was transferred to Recovery having tolerated the  procedure in stable condition.         Charles Sun MD    D: 2022 9:38:26       T: 2022 10:47:49     [id number]  Job#: [id number]     Doc#: [id number]    CC:

## (undated) DEVICE — SOLUTION IV 1000ML 0.9% SOD CHL PH 5 INJ USP VIAFLX PLAS

## (undated) DEVICE — COVER,LIGHT HANDLE,FLX,2/PK: Brand: MEDLINE INDUSTRIES, INC.

## (undated) DEVICE — SOCK SPEC L9IN WHT UNIV W/ STD PRT FOR FLD MGMT

## (undated) DEVICE — TOWEL,OR,DSP,ST,BLUE,STD,6/PK,12PK/CS: Brand: MEDLINE

## (undated) DEVICE — LEGGINGS, PAIR, 31X48, STERILE: Brand: MEDLINE

## (undated) DEVICE — JELLY,LUBE,STERILE,FLIP TOP,TUBE,2-OZ: Brand: MEDLINE

## (undated) DEVICE — 4-PORT MANIFOLD: Brand: NEPTUNE 2

## (undated) DEVICE — GAUZE,SPONGE,4"X4",16PLY,XRAY,STRL,LF: Brand: MEDLINE

## (undated) DEVICE — SOLUTION IV IRRIG POUR BRL 0.9% SODIUM CHL 2F7124

## (undated) DEVICE — DRAPE,REIN 53X77,STERILE: Brand: MEDLINE

## (undated) DEVICE — GLOVE SURG PROTEGRITY SLK LTX PF 6.0

## (undated) DEVICE — SET FLD COLL CLR W/ BLT IN WARN SYS FOR HYSTSCP DOLPHIN

## (undated) DEVICE — PAD,SANITARY,11 IN,MAXI,N-STRL,IND WRAP: Brand: MEDLINE

## (undated) DEVICE — VAGINAL PREP TRAY: Brand: MEDLINE INDUSTRIES, INC.

## (undated) DEVICE — MARKER,SKIN,WI/RULER AND LABELS: Brand: MEDLINE

## (undated) DEVICE — Y-TYPE TUR/BLADDER IRRIGATION SET, REGULATING CLAMP

## (undated) DEVICE — GOWN,SIRUS,FABRNF,XL,20/CS: Brand: MEDLINE

## (undated) DEVICE — DOUBLE BASIN SET: Brand: MEDLINE INDUSTRIES, INC.

## (undated) DEVICE — GLOVE ORANGE PI 7 1/2   MSG9075